# Patient Record
Sex: MALE | Race: WHITE | Employment: OTHER | URBAN - METROPOLITAN AREA
[De-identification: names, ages, dates, MRNs, and addresses within clinical notes are randomized per-mention and may not be internally consistent; named-entity substitution may affect disease eponyms.]

---

## 2017-11-01 LAB
ANION GAP SERPL CALCULATED.3IONS-SCNC: 13 MEQ/L (ref 7–13)
BUN BLDV-MCNC: 15 MG/DL (ref 8–23)
CALCIUM SERPL-MCNC: 9.1 MG/DL (ref 8.6–10.2)
CHLORIDE BLD-SCNC: 100 MEQ/L (ref 98–107)
CO2: 26 MEQ/L (ref 22–29)
CREAT SERPL-MCNC: 1.3 MG/DL (ref 0.7–1.2)
GFR AFRICAN AMERICAN: >60
GFR NON-AFRICAN AMERICAN: 53.6
GLUCOSE BLD-MCNC: 92 MG/DL (ref 74–109)
POTASSIUM SERPL-SCNC: 4.5 MEQ/L (ref 3.5–5.1)
SODIUM BLD-SCNC: 139 MEQ/L (ref 132–144)

## 2019-12-09 LAB
BILIRUBIN DIRECT: 0.1 MG/DL (ref 0–0.3)
CHOLESTEROL/HDL RATIO: 3.8
CHOLESTEROL: 94 MG/DL (ref 0–199)
HDLC SERPL-MCNC: 25 MG/DL
LDL CHOLESTEROL: 40 MG/DL (ref 0–99)
TRIGL SERPL-MCNC: 147 MG/DL (ref 0–149)
VLDLC SERPL CALC-MCNC: 29 MG/DL (ref 0–40)

## 2020-10-20 ENCOUNTER — NURSE ONLY (OUTPATIENT)
Dept: PRIMARY CARE CLINIC | Age: 80
End: 2020-10-20

## 2020-10-21 LAB
SARS-COV-2: NOT DETECTED
SOURCE: NORMAL

## 2020-10-28 ENCOUNTER — NURSE ONLY (OUTPATIENT)
Dept: PRIMARY CARE CLINIC | Age: 80
End: 2020-10-28

## 2020-10-29 LAB
SARS-COV-2: NOT DETECTED
SOURCE: NORMAL

## 2023-05-25 LAB
ANION GAP IN SER/PLAS: 14 MMOL/L (ref 10–20)
BASOPHILS (10*3/UL) IN BLOOD BY AUTOMATED COUNT: 0.06 X10E9/L (ref 0–0.1)
BASOPHILS/100 LEUKOCYTES IN BLOOD BY AUTOMATED COUNT: 0.8 % (ref 0–2)
CALCIUM (MG/DL) IN SER/PLAS: 9.5 MG/DL (ref 8.6–10.3)
CARBON DIOXIDE, TOTAL (MMOL/L) IN SER/PLAS: 27 MMOL/L (ref 21–32)
CHLORIDE (MMOL/L) IN SER/PLAS: 101 MMOL/L (ref 98–107)
CREATININE (MG/DL) IN SER/PLAS: 1.4 MG/DL (ref 0.5–1.3)
DACROCYTES PRESENCE IN BLOOD BY LIGHT MICROSCOPY: NORMAL
EOSINOPHILS (10*3/UL) IN BLOOD BY AUTOMATED COUNT: 0.06 X10E9/L (ref 0–0.4)
EOSINOPHILS/100 LEUKOCYTES IN BLOOD BY AUTOMATED COUNT: 0.8 % (ref 0–6)
ERYTHROCYTE DISTRIBUTION WIDTH (RATIO) BY AUTOMATED COUNT: 21.2 % (ref 11.5–14.5)
ERYTHROCYTE MEAN CORPUSCULAR HEMOGLOBIN CONCENTRATION (G/DL) BY AUTOMATED: 31.6 G/DL (ref 32–36)
ERYTHROCYTE MEAN CORPUSCULAR VOLUME (FL) BY AUTOMATED COUNT: 97 FL (ref 80–100)
ERYTHROCYTES (10*6/UL) IN BLOOD BY AUTOMATED COUNT: 3.54 X10E12/L (ref 4.5–5.9)
GFR MALE: 50 ML/MIN/1.73M2
GLUCOSE (MG/DL) IN SER/PLAS: 99 MG/DL (ref 74–99)
HEMATOCRIT (%) IN BLOOD BY AUTOMATED COUNT: 34.5 % (ref 41–52)
HEMOGLOBIN (G/DL) IN BLOOD: 10.9 G/DL (ref 13.5–17.5)
IMMATURE GRANULOCYTES/100 LEUKOCYTES IN BLOOD BY AUTOMATED COUNT: 4.4 % (ref 0–0.9)
LEUKOCYTES (10*3/UL) IN BLOOD BY AUTOMATED COUNT: 7.4 X10E9/L (ref 4.4–11.3)
LYMPHOCYTES (10*3/UL) IN BLOOD BY AUTOMATED COUNT: 0.99 X10E9/L (ref 0.8–3)
LYMPHOCYTES/100 LEUKOCYTES IN BLOOD BY AUTOMATED COUNT: 13.3 % (ref 13–44)
MONOCYTES (10*3/UL) IN BLOOD BY AUTOMATED COUNT: 0.46 X10E9/L (ref 0.05–0.8)
MONOCYTES/100 LEUKOCYTES IN BLOOD BY AUTOMATED COUNT: 6.2 % (ref 2–10)
NEUTROPHILS (10*3/UL) IN BLOOD BY AUTOMATED COUNT: 5.53 X10E9/L (ref 1.6–5.5)
NEUTROPHILS/100 LEUKOCYTES IN BLOOD BY AUTOMATED COUNT: 74.5 % (ref 40–80)
NRBC (PER 100 WBCS) BY AUTOMATED COUNT: 0.3 /100 WBC (ref 0–0)
OVALOCYTES PRESENCE IN BLOOD BY LIGHT MICROSCOPY: NORMAL
PARATHYRIN INTACT (PG/ML) IN SER/PLAS: 57.6 PG/ML (ref 18.5–88)
PHOSPHATE (MG/DL) IN SER/PLAS: 3.7 MG/DL (ref 2.5–4.9)
PLATELETS (10*3/UL) IN BLOOD AUTOMATED COUNT: 248 X10E9/L (ref 150–450)
POTASSIUM (MMOL/L) IN SER/PLAS: 4.6 MMOL/L (ref 3.5–5.3)
PROSTATE SPECIFIC ANTIGEN,SCREEN: 0.62 NG/ML (ref 0–4)
RBC MORPHOLOGY IN BLOOD: NORMAL
SCHISTOCYTES (PRESENCE) IN BLOOD BY LIGHT MICROSCOPY: NORMAL
SODIUM (MMOL/L) IN SER/PLAS: 137 MMOL/L (ref 136–145)
URATE (MG/DL) IN SER/PLAS: 5.5 MG/DL (ref 4–7.5)
UREA NITROGEN (MG/DL) IN SER/PLAS: 18 MG/DL (ref 6–23)

## 2023-07-20 LAB
ALBUMIN: 4.4 G/DL (ref 3.4–5)
ALP BLD-CCNC: 58 U/L (ref 33–136)
ALT SERPL-CCNC: 14 U/L (ref 10–52)
ANION GAP SERPL CALCULATED.3IONS-SCNC: 11 MMOL/L (ref 10–20)
AST SERPL-CCNC: 19 U/L (ref 9–39)
BICARBONATE: 29 MMOL/L (ref 21–32)
BILIRUB SERPL-MCNC: 0.6 MG/DL (ref 0–1.2)
CALCIUM SERPL-MCNC: 9.4 MG/DL (ref 8.6–10.3)
CHLORIDE BLD-SCNC: 104 MMOL/L (ref 98–107)
CREAT SERPL-MCNC: 1.57 MG/DL (ref 0.5–1.3)
EGFR FEMALE: ABNORMAL
EGFR MALE: 44 ML/MIN/1.73M2
GLUCOSE: 109 MG/DL (ref 74–99)
POTASSIUM SERPL-SCNC: 4.5 MMOL/L (ref 3.5–5.3)
SODIUM BLD-SCNC: 139 MMOL/L (ref 136–145)
TOTAL PROTEIN: 7 G/DL (ref 6.4–8.2)
UREA NITROGEN: 20 MG/DL (ref 6–23)

## 2023-08-18 RX ORDER — NITROGLYCERIN 0.4 MG/1
0.4 TABLET SUBLINGUAL EVERY 5 MIN PRN
COMMUNITY

## 2023-08-18 RX ORDER — ALLOPURINOL 100 MG/1
1 TABLET ORAL 2 TIMES DAILY
COMMUNITY

## 2023-08-18 RX ORDER — AMLODIPINE BESYLATE 5 MG/1
1 TABLET ORAL DAILY
COMMUNITY
End: 2024-01-03 | Stop reason: SDUPTHER

## 2023-08-18 RX ORDER — TAMSULOSIN HYDROCHLORIDE 0.4 MG/1
1 CAPSULE ORAL DAILY
COMMUNITY
Start: 2022-09-02

## 2023-08-18 RX ORDER — PANTOPRAZOLE SODIUM 40 MG/1
1 TABLET, DELAYED RELEASE ORAL DAILY
COMMUNITY

## 2023-08-18 RX ORDER — ALPRAZOLAM 0.5 MG/1
0.5 TABLET ORAL 3 TIMES DAILY PRN
COMMUNITY

## 2023-08-18 RX ORDER — METOPROLOL SUCCINATE 25 MG/1
1 TABLET, EXTENDED RELEASE ORAL DAILY
COMMUNITY
End: 2023-11-06 | Stop reason: ALTCHOICE

## 2023-08-18 RX ORDER — VIT C/E/ZN/COPPR/LUTEIN/ZEAXAN 250MG-90MG
CAPSULE ORAL DAILY
COMMUNITY

## 2023-08-18 RX ORDER — OLMESARTAN MEDOXOMIL 40 MG/1
1 TABLET ORAL DAILY
COMMUNITY
End: 2024-01-03 | Stop reason: SDUPTHER

## 2023-08-18 RX ORDER — ACETAMINOPHEN 500 MG
1 TABLET ORAL DAILY
COMMUNITY

## 2023-08-18 RX ORDER — ATORVASTATIN CALCIUM 20 MG/1
1 TABLET, FILM COATED ORAL DAILY
COMMUNITY
End: 2024-01-03 | Stop reason: SDUPTHER

## 2023-08-18 RX ORDER — AMLODIPINE BESYLATE 2.5 MG/1
2 TABLET ORAL DAILY
COMMUNITY
Start: 2022-11-09 | End: 2023-11-06 | Stop reason: ALTCHOICE

## 2023-08-18 RX ORDER — ASPIRIN 81 MG/1
1 TABLET ORAL DAILY
COMMUNITY

## 2023-08-18 RX ORDER — DOXAZOSIN 1 MG/1
1 TABLET ORAL DAILY
COMMUNITY
Start: 2021-10-04 | End: 2023-11-01 | Stop reason: SDUPTHER

## 2023-09-18 PROBLEM — Z98.62 STATUS POST ANGIOPLASTY: Status: ACTIVE | Noted: 2023-09-18

## 2023-09-18 PROBLEM — D64.9 ANEMIA: Status: ACTIVE | Noted: 2023-09-18

## 2023-09-18 PROBLEM — D75.1 POLYCYTHEMIA: Status: ACTIVE | Noted: 2023-09-18

## 2023-09-18 PROBLEM — I73.00 RAYNAUD'S PHENOMENON WITHOUT GANGRENE: Status: ACTIVE | Noted: 2023-09-18

## 2023-09-18 PROBLEM — R79.89 ELEVATED SERUM CREATININE: Status: ACTIVE | Noted: 2023-09-18

## 2023-09-18 PROBLEM — I10 HYPERTENSION: Status: ACTIVE | Noted: 2023-09-18

## 2023-09-18 PROBLEM — C64.2: Status: ACTIVE | Noted: 2023-09-18

## 2023-09-18 PROBLEM — E78.5 HYPERLIPIDEMIA: Status: ACTIVE | Noted: 2023-09-18

## 2023-09-18 PROBLEM — N18.30 CKD (CHRONIC KIDNEY DISEASE), STAGE III (MULTI): Status: ACTIVE | Noted: 2023-09-18

## 2023-09-18 PROBLEM — R01.1 SYSTOLIC EJECTION MURMUR: Status: ACTIVE | Noted: 2023-09-18

## 2023-09-18 PROBLEM — R06.02 SHORTNESS OF BREATH: Status: ACTIVE | Noted: 2023-09-18

## 2023-09-18 PROBLEM — R33.9 RETENTION OF URINE: Status: ACTIVE | Noted: 2023-09-18

## 2023-09-18 PROBLEM — N20.0 NEPHROLITHIASIS: Status: ACTIVE | Noted: 2023-09-18

## 2023-09-18 PROBLEM — I25.10 ARTERIOSCLEROSIS OF CORONARY ARTERY: Status: ACTIVE | Noted: 2023-09-18

## 2023-09-18 PROBLEM — I49.3 PREMATURE VENTRICULAR CONTRACTION: Status: ACTIVE | Noted: 2023-09-18

## 2023-09-18 PROBLEM — E66.3 OVERWEIGHT WITH BODY MASS INDEX (BMI) OF 28 TO 28.9 IN ADULT: Status: ACTIVE | Noted: 2023-09-18

## 2023-09-18 PROBLEM — N13.8 BPH WITH URINARY OBSTRUCTION: Status: ACTIVE | Noted: 2023-09-18

## 2023-09-18 PROBLEM — Z90.5 SINGLE KIDNEY: Status: ACTIVE | Noted: 2023-09-18

## 2023-09-18 PROBLEM — N40.1 BPH WITH URINARY OBSTRUCTION: Status: ACTIVE | Noted: 2023-09-18

## 2023-09-18 PROBLEM — Z87.891 FORMER SMOKER: Status: ACTIVE | Noted: 2023-09-18

## 2023-09-18 PROBLEM — N28.89 RENAL MASS, LEFT: Status: ACTIVE | Noted: 2023-09-18

## 2023-09-25 LAB
ANION GAP IN SER/PLAS: 11 MMOL/L (ref 10–20)
BAND NEUTROPHILS (10*3/UL) BLOOD MANUAL COUNT - WAM: 0.08 X10E9/L (ref 0–0.5)
BASOPHILS (10*3/UL) IN BLOOD BY MANUAL COUNT - WAM: 0 X10E9/L (ref 0–0.1)
BASOPHILS/100 LEUKOCYTES IN BLOOD BY MANUAL COUNT - WAM: 0 % (ref 0–2)
CALCIUM (MG/DL) IN SER/PLAS: 9.3 MG/DL (ref 8.6–10.3)
CARBON DIOXIDE, TOTAL (MMOL/L) IN SER/PLAS: 29 MMOL/L (ref 21–32)
CHLORIDE (MMOL/L) IN SER/PLAS: 102 MMOL/L (ref 98–107)
CREATININE (MG/DL) IN SER/PLAS: 1.64 MG/DL (ref 0.5–1.3)
DACROCYTES PRESENCE IN BLOOD BY LIGHT MICROSCOPY: NORMAL
EOSINOPHILS (10*3/UL) IN BLOOD BY MANUAL COUNT - WAM: 0.08 X10E9/L (ref 0–0.4)
EOSINOPHILS/100 LEUKOCYTES IN BLOOD BY MANUAL COUNT - WAM: 1 % (ref 0–6)
ERYTHROCYTE DISTRIBUTION WIDTH (RATIO) BY AUTOMATED COUNT: 21.6 % (ref 11.5–14.5)
ERYTHROCYTE MEAN CORPUSCULAR HEMOGLOBIN CONCENTRATION (G/DL) BY AUTOMATED: 31.5 G/DL (ref 32–36)
ERYTHROCYTE MEAN CORPUSCULAR VOLUME (FL) BY AUTOMATED COUNT: 100 FL (ref 80–100)
ERYTHROCYTES (10*6/UL) IN BLOOD BY AUTOMATED COUNT: 3.56 X10E12/L (ref 4.5–5.9)
GFR MALE: 41 ML/MIN/1.73M2
GLUCOSE (MG/DL) IN SER/PLAS: 103 MG/DL (ref 74–99)
HEMATOCRIT (%) IN BLOOD BY AUTOMATED COUNT: 35.5 % (ref 41–52)
HEMOGLOBIN (G/DL) IN BLOOD: 11.2 G/DL (ref 13.5–17.5)
IMMATURE GRANULOCYTES/100 LEUKOCYTES IN BLOOD BY AUTOMATED COUNT: 6.9 % (ref 0–0.9)
LEUKOCYTES (10*3/UL) IN BLOOD BY AUTOMATED COUNT: 7.5 X10E9/L (ref 4.4–11.3)
LYMPHOCYTES (10*3/UL) IN BLOOD BY MANUAL COUNT - WAM: 1.2 X10E9/L (ref 0.8–3)
LYMPHOCYTES/100 LEUKOCYTES IN BLOOD BY MANUAL COUNT - WAM: 16 % (ref 13–44)
MANUAL DIFFERENTIAL Y/N: ABNORMAL
METAMYELOCYTES (10*3/UL) IN BLOOD BY MANUAL COUNT - WAM: 0.15 X10E9/L (ref 0–0)
METAMYELOCYTES/100 LEUKOCYTES IN BLOOD BY MANUAL COUNT - WAM: 2 % (ref 0–0)
MONOCYTES (10*3/UL) IN BLOOD BY MANUAL COUNT - WAM: 0.3 X10E9/L (ref 0.05–0.8)
MONOCYTES/100 LEUKOCYTES IN BLOOD BY MANUAL COUNT - WAM: 4 % (ref 2–10)
MYELOCYTES (10*3/UL) IN BLOOD BY MANUAL COUNT - WAM: 0.23 X10E9/L (ref 0–0)
MYELOCYTES/100 LEUKOCYTES IN BLOOD BY MANUAL COUNT - WAM: 3 % (ref 0–0)
NEUTROPHILS (SEGS+BANDS) (10*3/UL) MANUAL COUNT - WAM: 5.56 X10E9/L (ref 1.6–5.5)
NEUTROPHILS BAND FORM/100 LEUKOCYTES IN BLOOD BY MANUAL COUNT - WAM: 1 % (ref 0–5)
NRBC (PER 100 WBCS) BY AUTOMATED COUNT: 0.3 /100 WBC (ref 0–0)
OVALOCYTES PRESENCE IN BLOOD BY LIGHT MICROSCOPY: NORMAL
PARATHYRIN INTACT (PG/ML) IN SER/PLAS: 44.6 PG/ML (ref 18.5–88)
PHOSPHATE (MG/DL) IN SER/PLAS: 3.8 MG/DL (ref 2.5–4.9)
PLATELETS (10*3/UL) IN BLOOD AUTOMATED COUNT: 249 X10E9/L (ref 150–450)
POTASSIUM (MMOL/L) IN SER/PLAS: 4.6 MMOL/L (ref 3.5–5.3)
PROSTATE SPECIFIC AG (NG/ML) IN SER/PLAS: 0.74 NG/ML (ref 0–4)
RBC MORPHOLOGY IN BLOOD: NORMAL
SCHISTOCYTES (PRESENCE) IN BLOOD BY LIGHT MICROSCOPY: NORMAL
SEGMENTED NEUTROPHILS (10*3/UL) BLOOD MANUAL - WAM: 5.48 X10E9/L (ref 1.6–5)
SEGMENTED NEUTROPHILS/100 LEUKOCYTES BY MANUAL COUNT -: 73 % (ref 40–80)
SODIUM (MMOL/L) IN SER/PLAS: 137 MMOL/L (ref 136–145)
URATE (MG/DL) IN SER/PLAS: 7 MG/DL (ref 4–7.5)
UREA NITROGEN (MG/DL) IN SER/PLAS: 28 MG/DL (ref 6–23)

## 2023-10-18 ENCOUNTER — APPOINTMENT (OUTPATIENT)
Dept: HEMATOLOGY/ONCOLOGY | Facility: CLINIC | Age: 83
End: 2023-10-18

## 2023-11-01 DIAGNOSIS — I10 PRIMARY HYPERTENSION: ICD-10-CM

## 2023-11-01 RX ORDER — DOXAZOSIN 1 MG/1
1 TABLET ORAL DAILY
Qty: 90 TABLET | Refills: 3 | Status: SHIPPED | OUTPATIENT
Start: 2023-11-01 | End: 2024-01-03 | Stop reason: SDUPTHER

## 2023-11-03 ENCOUNTER — LAB (OUTPATIENT)
Dept: LAB | Facility: CLINIC | Age: 83
End: 2023-11-03
Payer: MEDICARE

## 2023-11-03 DIAGNOSIS — I12.9 HYPERTENSIVE CHRONIC KIDNEY DISEASE WITH STAGE 1 THROUGH STAGE 4 CHRONIC KIDNEY DISEASE, OR UNSPECIFIED CHRONIC KIDNEY DISEASE: ICD-10-CM

## 2023-11-03 DIAGNOSIS — D63.1 ANEMIA IN CHRONIC KIDNEY DISEASE (CODE): ICD-10-CM

## 2023-11-03 DIAGNOSIS — D75.1 POLYCYTHEMIA: ICD-10-CM

## 2023-11-03 DIAGNOSIS — N18.32 CHRONIC KIDNEY DISEASE, STAGE 3B (MULTI): ICD-10-CM

## 2023-11-03 DIAGNOSIS — D75.1 POLYCYTHEMIA: Primary | ICD-10-CM

## 2023-11-03 DIAGNOSIS — N18.32 CHRONIC KIDNEY DISEASE, STAGE 3B (MULTI): Primary | ICD-10-CM

## 2023-11-03 LAB
ALBUMIN SERPL BCP-MCNC: 4.4 G/DL (ref 3.4–5)
ALP SERPL-CCNC: 60 U/L (ref 33–136)
ALT SERPL W P-5'-P-CCNC: 15 U/L (ref 10–52)
ANION GAP SERPL CALC-SCNC: 11 MMOL/L (ref 10–20)
AST SERPL W P-5'-P-CCNC: 20 U/L (ref 9–39)
BASOPHILS # BLD AUTO: 0.03 X10*3/UL (ref 0–0.1)
BASOPHILS NFR BLD AUTO: 0.5 %
BILIRUB SERPL-MCNC: 0.7 MG/DL (ref 0–1.2)
BUN SERPL-MCNC: 20 MG/DL (ref 6–23)
CALCIUM SERPL-MCNC: 9.5 MG/DL (ref 8.6–10.3)
CHLORIDE SERPL-SCNC: 103 MMOL/L (ref 98–107)
CO2 SERPL-SCNC: 30 MMOL/L (ref 21–32)
CREAT SERPL-MCNC: 1.59 MG/DL (ref 0.5–1.3)
EOSINOPHIL # BLD AUTO: 0.07 X10*3/UL (ref 0–0.4)
EOSINOPHIL NFR BLD AUTO: 1.1 %
ERYTHROCYTE [DISTWIDTH] IN BLOOD BY AUTOMATED COUNT: 19.8 % (ref 11.5–14.5)
FERRITIN SERPL-MCNC: 69 NG/ML (ref 20–300)
GFR SERPL CREATININE-BSD FRML MDRD: 43 ML/MIN/1.73M*2
GLUCOSE SERPL-MCNC: 127 MG/DL (ref 74–99)
HCT VFR BLD AUTO: 33.1 % (ref 41–52)
HGB BLD-MCNC: 10.8 G/DL (ref 13.5–17.5)
IMM GRANULOCYTES # BLD AUTO: 0.31 X10*3/UL (ref 0–0.5)
IMM GRANULOCYTES NFR BLD AUTO: 5 % (ref 0–0.9)
IRON SATN MFR SERPL: 27 % (ref 25–45)
IRON SERPL-MCNC: 82 UG/DL (ref 35–150)
LYMPHOCYTES # BLD AUTO: 1.16 X10*3/UL (ref 0.8–3)
LYMPHOCYTES NFR BLD AUTO: 18.7 %
MCH RBC QN AUTO: 32.3 PG (ref 26–34)
MCHC RBC AUTO-ENTMCNC: 32.6 G/DL (ref 32–36)
MCV RBC AUTO: 99 FL (ref 80–100)
MONOCYTES # BLD AUTO: 0.28 X10*3/UL (ref 0.05–0.8)
MONOCYTES NFR BLD AUTO: 4.5 %
NEUTROPHILS # BLD AUTO: 4.36 X10*3/UL (ref 1.6–5.5)
NEUTROPHILS NFR BLD AUTO: 70.2 %
PLATELET # BLD AUTO: 212 X10*3/UL (ref 150–450)
POTASSIUM SERPL-SCNC: 4.1 MMOL/L (ref 3.5–5.3)
PROT SERPL-MCNC: 6.9 G/DL (ref 6.4–8.2)
RBC # BLD AUTO: 3.34 X10*6/UL (ref 4.5–5.9)
SODIUM SERPL-SCNC: 140 MMOL/L (ref 136–145)
TIBC SERPL-MCNC: 307 UG/DL (ref 240–445)
UIBC SERPL-MCNC: 225 UG/DL (ref 110–370)
WBC # BLD AUTO: 6.2 X10*3/UL (ref 4.4–11.3)

## 2023-11-03 PROCEDURE — 80053 COMPREHEN METABOLIC PANEL: CPT

## 2023-11-03 PROCEDURE — 82728 ASSAY OF FERRITIN: CPT

## 2023-11-03 PROCEDURE — 36415 COLL VENOUS BLD VENIPUNCTURE: CPT

## 2023-11-03 PROCEDURE — 83540 ASSAY OF IRON: CPT

## 2023-11-03 PROCEDURE — 85025 COMPLETE CBC W/AUTO DIFF WBC: CPT

## 2023-11-06 ENCOUNTER — OFFICE VISIT (OUTPATIENT)
Dept: HEMATOLOGY/ONCOLOGY | Facility: CLINIC | Age: 83
End: 2023-11-06
Payer: MEDICARE

## 2023-11-06 VITALS
HEART RATE: 100 BPM | DIASTOLIC BLOOD PRESSURE: 73 MMHG | RESPIRATION RATE: 15 BRPM | TEMPERATURE: 97.2 F | BODY MASS INDEX: 29.39 KG/M2 | SYSTOLIC BLOOD PRESSURE: 156 MMHG | WEIGHT: 204.81 LBS | OXYGEN SATURATION: 99 %

## 2023-11-06 DIAGNOSIS — N40.1 BPH WITH URINARY OBSTRUCTION: ICD-10-CM

## 2023-11-06 DIAGNOSIS — C64.2 CARCINOMA OF LEFT KIDNEY (MULTI): ICD-10-CM

## 2023-11-06 DIAGNOSIS — D50.8 OTHER IRON DEFICIENCY ANEMIA: ICD-10-CM

## 2023-11-06 DIAGNOSIS — E83.110 HEREDITARY HEMOCHROMATOSIS (CMS-HCC): Primary | ICD-10-CM

## 2023-11-06 DIAGNOSIS — E78.2 MIXED HYPERLIPIDEMIA: ICD-10-CM

## 2023-11-06 DIAGNOSIS — I10 PRIMARY HYPERTENSION: ICD-10-CM

## 2023-11-06 DIAGNOSIS — N13.8 BPH WITH URINARY OBSTRUCTION: ICD-10-CM

## 2023-11-06 PROCEDURE — 1036F TOBACCO NON-USER: CPT | Performed by: INTERNAL MEDICINE

## 2023-11-06 PROCEDURE — 99214 OFFICE O/P EST MOD 30 MIN: CPT | Performed by: INTERNAL MEDICINE

## 2023-11-06 PROCEDURE — 1126F AMNT PAIN NOTED NONE PRSNT: CPT | Performed by: INTERNAL MEDICINE

## 2023-11-06 PROCEDURE — 1159F MED LIST DOCD IN RCRD: CPT | Performed by: INTERNAL MEDICINE

## 2023-11-06 PROCEDURE — 3077F SYST BP >= 140 MM HG: CPT | Performed by: INTERNAL MEDICINE

## 2023-11-06 PROCEDURE — 3078F DIAST BP <80 MM HG: CPT | Performed by: INTERNAL MEDICINE

## 2023-11-06 ASSESSMENT — PATIENT HEALTH QUESTIONNAIRE - PHQ9
7. TROUBLE CONCENTRATING ON THINGS, SUCH AS READING THE NEWSPAPER OR WATCHING TELEVISION: NOT AT ALL
6. FEELING BAD ABOUT YOURSELF - OR THAT YOU ARE A FAILURE OR HAVE LET YOURSELF OR YOUR FAMILY DOWN: 0
5. POOR APPETITE OR OVEREATING: NOT AT ALL
9. THOUGHTS THAT YOU WOULD BE BETTER OFF DEAD, OR OF HURTING YOURSELF: NOT AT ALL
1. LITTLE INTEREST OR PLEASURE IN DOING THINGS: NOT AT ALL
9. THOUGHTS THAT YOU WOULD BE BETTER OFF DEAD, OR OF HURTING YOURSELF: NOT AT ALL
1. LITTLE INTEREST OR PLEASURE IN DOING THINGS: NOT AT ALL
5. POOR APPETITE OR OVEREATING: 0
2. FEELING DOWN, DEPRESSED, IRRITABLE, OR HOPELESS: NOT AT ALL
2. FEELING DOWN, DEPRESSED OR HOPELESS: NOT AT ALL
3. TROUBLE FALLING OR STAYING ASLEEP OR SLEEPING TOO MUCH: SEVERAL DAYS
1. LITTLE INTEREST OR PLEASURE IN DOING THINGS: NOT AT ALL
3. TROUBLE FALLING OR STAYING ASLEEP OR SLEEPING TOO MUCH: 1
2. FEELING DOWN, DEPRESSED, IRRITABLE, OR HOPELESS: 0
9. THOUGHTS THAT YOU WOULD BE BETTER OFF DEAD, OR OF HURTING YOURSELF: 0
6. FEELING BAD ABOUT YOURSELF - OR THAT YOU ARE A FAILURE OR HAVE LET YOURSELF OR YOUR FAMILY DOWN: NOT AT ALL
4. FEELING TIRED OR HAVING LITTLE ENERGY: 1
7. TROUBLE CONCENTRATING ON THINGS, SUCH AS READING THE NEWSPAPER OR WATCHING TELEVISION: NOT AT ALL
4. FEELING TIRED OR HAVING LITTLE ENERGY: SEVERAL DAYS
SUM OF ALL RESPONSES TO PHQ9 QUESTIONS 1 AND 2: 0
2. FEELING DOWN, DEPRESSED OR HOPELESS: NOT AT ALL
5. POOR APPETITE OR OVEREATING: NOT AT ALL
8. MOVING OR SPEAKING SO SLOWLY THAT OTHER PEOPLE COULD HAVE NOTICED. OR THE OPPOSITE, BEING SO FIGETY OR RESTLESS THAT YOU HAVE BEEN MOVING AROUND A LOT MORE THAN USUAL: NOT AT ALL
SUM OF ALL RESPONSES TO PHQ QUESTIONS 1-9: 2
4. FEELING TIRED OR HAVING LITTLE ENERGY: SEVERAL DAYS
6. FEELING BAD ABOUT YOURSELF - OR THAT YOU ARE A FAILURE OR HAVE LET YOURSELF OR YOUR FAMILY DOWN: NOT AT ALL
1. LITTLE INTEREST OR PLEASURE IN DOING THINGS: 0
8. MOVING OR SPEAKING SO SLOWLY THAT OTHER PEOPLE COULD HAVE NOTICED. OR THE OPPOSITE, BEING SO FIGETY OR RESTLESS THAT YOU HAVE BEEN MOVING AROUND A LOT MORE THAN USUAL: 0
7. TROUBLE CONCENTRATING ON THINGS, SUCH AS READING THE NEWSPAPER OR WATCHING TELEVISION: 0
8. MOVING OR SPEAKING SO SLOWLY THAT OTHER PEOPLE COULD HAVE NOTICED. OR THE OPPOSITE, BEING SO FIGETY OR RESTLESS THAT YOU HAVE BEEN MOVING AROUND A LOT MORE THAN USUAL: NOT AT ALL
3. TROUBLE FALLING OR STAYING ASLEEP OR SLEEPING TOO MUCH: SEVERAL DAYS
SUM OF ALL RESPONSES TO PHQ QUESTIONS 1-9: 2

## 2023-11-06 ASSESSMENT — ENCOUNTER SYMPTOMS
DEPRESSION: 0
PSYCHIATRIC NEGATIVE: 1
EYES NEGATIVE: 1
LOSS OF SENSATION IN FEET: 0
NEUROLOGICAL NEGATIVE: 1
HEMATOLOGIC/LYMPHATIC NEGATIVE: 1
MUSCULOSKELETAL NEGATIVE: 1
CARDIOVASCULAR NEGATIVE: 1
OCCASIONAL FEELINGS OF UNSTEADINESS: 0
RESPIRATORY NEGATIVE: 1
ENDOCRINE NEGATIVE: 1
CONSTITUTIONAL NEGATIVE: 1
GASTROINTESTINAL NEGATIVE: 1

## 2023-11-06 ASSESSMENT — COLUMBIA-SUICIDE SEVERITY RATING SCALE - C-SSRS
2. HAVE YOU ACTUALLY HAD ANY THOUGHTS OF KILLING YOURSELF?: NO
6. HAVE YOU EVER DONE ANYTHING, STARTED TO DO ANYTHING, OR PREPARED TO DO ANYTHING TO END YOUR LIFE?: NO
1. IN THE PAST MONTH, HAVE YOU WISHED YOU WERE DEAD OR WISHED YOU COULD GO TO SLEEP AND NOT WAKE UP?: NO

## 2023-11-06 ASSESSMENT — PAIN SCALES - GENERAL: PAINLEVEL: 0-NO PAIN

## 2023-11-06 NOTE — PROGRESS NOTES
Patient ID: Arnoldo Gracia is a 83 y.o. male.  Referring Physician: No referring provider defined for this encounter.  Primary Care Provider: Zuleyka Kumar MD  Visit Type: Follow Up      Subjective    HPI  How is my hemoglobin?    Review of Systems   Constitutional: Negative.    HENT:  Negative.     Eyes: Negative.    Respiratory: Negative.     Cardiovascular: Negative.    Gastrointestinal: Negative.    Endocrine: Negative.    Genitourinary: Negative.     Musculoskeletal: Negative.    Skin: Negative.    Neurological: Negative.    Hematological: Negative.    Psychiatric/Behavioral: Negative.          Objective   BSA: 2.14 meters squared  /73 Comment: antony guerra notified  Pulse 100   Temp 36.2 °C (97.2 °F)   Resp 15   Wt 92.9 kg (204 lb 12.9 oz)   SpO2 99%   BMI 29.39 kg/m²      has a past medical history of Essential (primary) hypertension, Old myocardial infarction, Personal history of diseases of the blood and blood-forming organs and certain disorders involving the immune mechanism, Personal history of other diseases of the circulatory system, Personal history of other diseases of the circulatory system, Personal history of other endocrine, nutritional and metabolic disease, Personal history of other specified conditions, and Personal history of other specified conditions.   has a past surgical history that includes Other surgical history (04/11/2018); Other surgical history (11/07/2021); Other surgical history (11/07/2021); Other surgical history (11/07/2021); Other surgical history (11/07/2021); Other surgical history (11/07/2021); Other surgical history (11/14/2018); Other surgical history (11/14/2018); Other surgical history (11/14/2018); and Other surgical history (11/14/2018).  Family History   Problem Relation Name Age of Onset    Hypertension Mother      Other (cardiac disorder) Mother      Other (cardiac disorder) Father      Emphysema Father      Heart attack Father      Colon cancer  Sister      Stomach cancer Sister       Oncology History    No history exists.       Arnoldo Gracia  reports that he has never smoked. He has never used smokeless tobacco.  He  reports no history of alcohol use.  He  reports no history of drug use.    Physical Exam  Vitals reviewed.   HENT:      Head: Normocephalic.      Mouth/Throat:      Mouth: Mucous membranes are moist.   Eyes:      Extraocular Movements: Extraocular movements intact.      Pupils: Pupils are equal, round, and reactive to light.   Cardiovascular:      Rate and Rhythm: Normal rate and regular rhythm.      Heart sounds: Normal heart sounds.   Pulmonary:      Breath sounds: Normal breath sounds.   Abdominal:      General: Bowel sounds are normal.      Palpations: Abdomen is soft.   Musculoskeletal:         General: Normal range of motion.      Cervical back: Normal range of motion and neck supple.   Skin:     General: Skin is warm and dry.   Neurological:      General: No focal deficit present.      Mental Status: He is alert.   Psychiatric:         Mood and Affect: Mood normal.         WBC   Date/Time Value Ref Range Status   11/03/2023 08:41 AM 6.2 4.4 - 11.3 x10*3/uL Final   09/25/2023 09:10 AM 7.5 4.4 - 11.3 x10E9/L Final   07/20/2023 09:20 AM CANCELED       Comment:     Result canceled by the ancillary.   07/20/2023 09:08 AM 6.3 4.4 - 11.3 x10E9/L Final     nRBC   Date Value Ref Range Status   09/25/2023 0.3 0.0 - 0.0 /100 WBC Final   07/20/2023 CANCELED       Comment:     Result canceled by the ancillary.   05/25/2023 0.3 0.0 - 0.0 /100 WBC Final     RBC   Date Value Ref Range Status   11/03/2023 3.34 (L) 4.50 - 5.90 x10*6/uL Final   09/25/2023 3.56 (L) 4.50 - 5.90 x10E12/L Final   07/20/2023 CANCELED       Comment:     Result canceled by the ancillary.   07/20/2023 3.46 (L) 4.50 - 5.90 x10E12/L Final     Hemoglobin   Date Value Ref Range Status   11/03/2023 10.8 (L) 13.5 - 17.5 g/dL Final   09/25/2023 11.2 (L) 13.5 - 17.5 g/dL Final  "  07/20/2023 CANCELED       Comment:     Result canceled by the ancillary.   07/20/2023 11.0 (L) 13.5 - 17.5 g/dL Final     Hematocrit   Date Value Ref Range Status   11/03/2023 33.1 (L) 41.0 - 52.0 % Final   09/25/2023 35.5 (L) 41.0 - 52.0 % Final   07/20/2023 CANCELED       Comment:     Result canceled by the ancillary.   07/20/2023 33.2 (L) 41.0 - 52.0 % Final     MCV   Date/Time Value Ref Range Status   11/03/2023 08:41 AM 99 80 - 100 fL Final   09/25/2023 09:10  80 - 100 fL Final   07/20/2023 09:20 AM CANCELED       Comment:     Result canceled by the ancillary.   07/20/2023 09:08 AM 96 80 - 100 fL Final     MCH   Date/Time Value Ref Range Status   11/03/2023 08:41 AM 32.3 26.0 - 34.0 pg Final     MCHC   Date/Time Value Ref Range Status   11/03/2023 08:41 AM 32.6 32.0 - 36.0 g/dL Final   09/25/2023 09:10 AM 31.5 (L) 32.0 - 36.0 g/dL Final   07/20/2023 09:20 AM CANCELED       Comment:     Result canceled by the ancillary.   07/20/2023 09:08 AM 33.1 32.0 - 36.0 g/dL Final     RDW   Date/Time Value Ref Range Status   11/03/2023 08:41 AM 19.8 (H) 11.5 - 14.5 % Final   09/25/2023 09:10 AM 21.6 (H) 11.5 - 14.5 % Final   07/20/2023 09:20 AM CANCELED       Comment:     Result canceled by the ancillary.   07/20/2023 09:08 AM 20.7 (H) 11.5 - 14.5 % Final     Platelets   Date/Time Value Ref Range Status   11/03/2023 08:41  150 - 450 x10*3/uL Final   09/25/2023 09:10  150 - 450 x10E9/L Final   07/20/2023 09:20 AM CANCELED       Comment:     Result canceled by the ancillary.   07/20/2023 09:08  150 - 450 x10E9/L Final     No results found for: \"MPV\"  Neutrophils %   Date/Time Value Ref Range Status   11/03/2023 08:41 AM 70.2 40.0 - 80.0 % Final   05/25/2023 10:52 AM 74.5 40.0 - 80.0 % Final   03/15/2023 09:43 AM 66.9 40.0 - 80.0 % Final   12/15/2022 08:24 AM 71.1 40.0 - 80.0 % Final     Immature Granulocytes %, Automated   Date/Time Value Ref Range Status   11/03/2023 08:41 AM 5.0 (H) 0.0 - 0.9 % " Final     Comment:     Immature Granulocyte Count (IG) includes promyelocytes, myelocytes and metamyelocytes but does not include bands. Percent differential counts (%) should be interpreted in the context of the absolute cell counts (cells/UL).   09/25/2023 09:10 AM 6.9 (H) 0.0 - 0.9 % Final     Comment:      Immature Granulocyte Count (IG) includes promyelocytes,    myelocytes and metamyelocytes but does not include bands.   Percent differential counts (%) should be interpreted in the   context of the absolute cell counts (cells/L).     07/20/2023 09:08 AM 5.1 (H) 0.0 - 0.9 % Final     Comment:      Immature Granulocyte Count (IG) includes promyelocytes,    myelocytes and metamyelocytes but does not include bands.   Percent differential counts (%) should be interpreted in the   context of the absolute cell counts (cells/L).     05/25/2023 10:52 AM 4.4 (H) 0.0 - 0.9 % Final     Comment:      Immature Granulocyte Count (IG) includes promyelocytes,    myelocytes and metamyelocytes but does not include bands.   Percent differential counts (%) should be interpreted in the   context of the absolute cell counts (cells/L).       Lymphocytes %   Date/Time Value Ref Range Status   11/03/2023 08:41 AM 18.7 13.0 - 44.0 % Final   09/25/2023 09:10 AM 16.0 13.0 - 44.0 % Final   07/20/2023 09:08 AM 21.0 13.0 - 44.0 % Final   05/25/2023 10:52 AM 13.3 13.0 - 44.0 % Final   04/24/2023 08:28 AM 13.0 13.0 - 44.0 % Final     Monocytes %   Date/Time Value Ref Range Status   11/03/2023 08:41 AM 4.5 2.0 - 10.0 % Final   09/25/2023 09:10 AM 4.0 2.0 - 10.0 % Final   07/20/2023 09:08 AM 2.0 2.0 - 10.0 % Final   05/25/2023 10:52 AM 6.2 2.0 - 10.0 % Final   04/24/2023 08:28 AM 5.0 2.0 - 10.0 % Final     Eosinophils %   Date/Time Value Ref Range Status   11/03/2023 08:41 AM 1.1 0.0 - 6.0 % Final   09/25/2023 09:10 AM 1.0 0.0 - 6.0 % Final   07/20/2023 09:08 AM 0.0 0.0 - 6.0 % Final   05/25/2023 10:52 AM 0.8 0.0 - 6.0 % Final   04/24/2023 08:28  AM 1.0 0.0 - 6.0 % Final     Basophils %   Date/Time Value Ref Range Status   11/03/2023 08:41 AM 0.5 0.0 - 2.0 % Final   09/25/2023 09:10 AM 0.0 0.0 - 2.0 % Final   07/20/2023 09:08 AM 0.0 0.0 - 2.0 % Final   05/25/2023 10:52 AM 0.8 0.0 - 2.0 % Final   04/24/2023 08:28 AM 1.0 0.0 - 2.0 % Final     Neutrophils Absolute   Date/Time Value Ref Range Status   11/03/2023 08:41 AM 4.36 1.60 - 5.50 x10*3/uL Final     Comment:     Percent differential counts (%) should be interpreted in the context of the absolute cell counts (cells/uL).   05/25/2023 10:52 AM 5.53 (H) 1.60 - 5.50 x10E9/L Final   03/15/2023 09:43 AM 4.09 1.60 - 5.50 x10E9/L Final   12/15/2022 08:24 AM 5.85 (H) 1.60 - 5.50 x10E9/L Final     Immature Granulocytes Absolute, Automated   Date/Time Value Ref Range Status   11/03/2023 08:41 AM 0.31 0.00 - 0.50 x10*3/uL Final     Lymphocytes Absolute   Date/Time Value Ref Range Status   11/03/2023 08:41 AM 1.16 0.80 - 3.00 x10*3/uL Final   05/25/2023 10:52 AM 0.99 0.80 - 3.00 x10E9/L Final   03/15/2023 09:43 AM 1.24 0.80 - 3.00 x10E9/L Final   12/15/2022 08:24 AM 1.47 0.80 - 3.00 x10E9/L Final     Monocytes Absolute   Date/Time Value Ref Range Status   11/03/2023 08:41 AM 0.28 0.05 - 0.80 x10*3/uL Final   05/25/2023 10:52 AM 0.46 0.05 - 0.80 x10E9/L Final   03/15/2023 09:43 AM 0.35 0.05 - 0.80 x10E9/L Final   12/15/2022 08:24 AM 0.60 0.05 - 0.80 x10E9/L Final     Eosinophils Absolute   Date/Time Value Ref Range Status   11/03/2023 08:41 AM 0.07 0.00 - 0.40 x10*3/uL Final   09/25/2023 09:10 AM 0.08 0.00 - 0.40 x10E9/L Final   07/20/2023 09:08 AM 0.00 0.00 - 0.40 x10E9/L Final   05/25/2023 10:52 AM 0.06 0.00 - 0.40 x10E9/L Final   04/24/2023 08:28 AM 0.06 0.00 - 0.40 x10E9/L Final     Basophils Absolute   Date/Time Value Ref Range Status   11/03/2023 08:41 AM 0.03 0.00 - 0.10 x10*3/uL Final   09/25/2023 09:10 AM 0.00 0.00 - 0.10 x10E9/L Final   07/20/2023 09:08 AM 0.00 0.00 - 0.10 x10E9/L Final   05/25/2023 10:52 AM  "0.06 0.00 - 0.10 x10E9/L Final   04/24/2023 08:28 AM 0.06 0.00 - 0.10 x10E9/L Final       No components found for: \"PT\"  aPTT   Date/Time Value Ref Range Status   07/08/2018 06:50 AM 29 25 - 36 sec Final     Comment:       THE APTT IS NO LONGER USED FOR MONITORING     UNFRACTIONATED HEPARIN THERAPY.    FOR MONITORING HEPARIN THERAPY,     USE THE HEPARIN ASSAY.     06/14/2018 10:11 AM 29 25 - 36 sec Final     Comment:       THE APTT IS NO LONGER USED FOR MONITORING     UNFRACTIONATED HEPARIN THERAPY.    FOR MONITORING HEPARIN THERAPY,     USE THE HEPARIN ASSAY.         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   -currently on PO iron supplementation three times per week  -labs done on 11/3/2023 included CBC, COMP, iron panel + ferritin  -results reviewed--wbc 6.2, hgb 10.8, plt 212,000, creatinine 1.59, AST 20, ALT 15, ferritin 69, TIBC 307, sat 27%  -no evidence of iron overload  -hgb also too high to qualify for Gopi  -will continue to follow Q4 months     2) hypertension  -on amlodipine  -on olmesartan     3) hyperlipidemia  -on atorvastatin     4) BPH  -on flomax  -on cardura     5) renal carcinoma  -6/28/2018 left partial nephrectomy: renal cell carcinoma, 2.7cm mass; pT1aNx; margins uninvolved; ISUP nuclear grade 2    6) hereditary hemochromatosis  -used to undergo regular therapeutic phlebotomy  -however, over time has become too anemic to be phlebotomized any more      Problem List Items Addressed This Visit    None           Giovani Brito MD                         "

## 2024-01-03 ENCOUNTER — OFFICE VISIT (OUTPATIENT)
Dept: CARDIOLOGY | Facility: CLINIC | Age: 84
End: 2024-01-03
Payer: MEDICARE

## 2024-01-03 VITALS
BODY MASS INDEX: 29.13 KG/M2 | DIASTOLIC BLOOD PRESSURE: 60 MMHG | HEART RATE: 96 BPM | WEIGHT: 203 LBS | SYSTOLIC BLOOD PRESSURE: 130 MMHG

## 2024-01-03 DIAGNOSIS — E78.2 MIXED HYPERLIPIDEMIA: ICD-10-CM

## 2024-01-03 DIAGNOSIS — N18.30 STAGE 3 CHRONIC KIDNEY DISEASE, UNSPECIFIED WHETHER STAGE 3A OR 3B CKD (MULTI): ICD-10-CM

## 2024-01-03 DIAGNOSIS — I25.10 ARTERIOSCLEROSIS OF CORONARY ARTERY: ICD-10-CM

## 2024-01-03 DIAGNOSIS — Z01.810 PREOPERATIVE CARDIOVASCULAR EXAMINATION: Primary | ICD-10-CM

## 2024-01-03 DIAGNOSIS — I10 PRIMARY HYPERTENSION: ICD-10-CM

## 2024-01-03 DIAGNOSIS — Z90.5 SINGLE KIDNEY: ICD-10-CM

## 2024-01-03 DIAGNOSIS — Z98.62 STATUS POST ANGIOPLASTY: ICD-10-CM

## 2024-01-03 PROCEDURE — 93000 ELECTROCARDIOGRAM COMPLETE: CPT | Performed by: INTERNAL MEDICINE

## 2024-01-03 PROCEDURE — 1036F TOBACCO NON-USER: CPT | Performed by: INTERNAL MEDICINE

## 2024-01-03 PROCEDURE — 3078F DIAST BP <80 MM HG: CPT | Performed by: INTERNAL MEDICINE

## 2024-01-03 PROCEDURE — 1160F RVW MEDS BY RX/DR IN RCRD: CPT | Performed by: INTERNAL MEDICINE

## 2024-01-03 PROCEDURE — 1126F AMNT PAIN NOTED NONE PRSNT: CPT | Performed by: INTERNAL MEDICINE

## 2024-01-03 PROCEDURE — 3075F SYST BP GE 130 - 139MM HG: CPT | Performed by: INTERNAL MEDICINE

## 2024-01-03 PROCEDURE — 1159F MED LIST DOCD IN RCRD: CPT | Performed by: INTERNAL MEDICINE

## 2024-01-03 PROCEDURE — 99214 OFFICE O/P EST MOD 30 MIN: CPT | Performed by: INTERNAL MEDICINE

## 2024-01-03 RX ORDER — ATORVASTATIN CALCIUM 20 MG/1
20 TABLET, FILM COATED ORAL DAILY
Qty: 90 TABLET | Refills: 3 | Status: SHIPPED | OUTPATIENT
Start: 2024-01-03 | End: 2025-01-02

## 2024-01-03 RX ORDER — OLMESARTAN MEDOXOMIL 40 MG/1
40 TABLET ORAL DAILY
Qty: 90 TABLET | Refills: 3 | Status: SHIPPED | OUTPATIENT
Start: 2024-01-03 | End: 2025-01-02

## 2024-01-03 RX ORDER — AMLODIPINE BESYLATE 5 MG/1
5 TABLET ORAL DAILY
Qty: 90 TABLET | Refills: 3 | Status: SHIPPED | OUTPATIENT
Start: 2024-01-03 | End: 2025-01-02

## 2024-01-03 RX ORDER — DOXAZOSIN 1 MG/1
1 TABLET ORAL DAILY
Qty: 90 TABLET | Refills: 3 | Status: SHIPPED | OUTPATIENT
Start: 2024-01-03

## 2024-01-03 NOTE — PROGRESS NOTES
Patient with multiple comorbidities as noted below, including atherosclerotic native vessel coronary artery disease, hypertension, dyslipidemia, presents for preoperative cardiac risk assessment for umbilical hernia repair.      Subjective :     Interval review of systems is negative for chest discomfort pressure tightness heaviness palpitations lightheadedness orthopnea paroxysmal nocturnal dyspnea dependent edema or claudication TIA or CVA type symptoms or bleeding diathesis    Patient has had left kidney removal and inguinal hernia surgery in the last few years without any untoward events.      History so Far :  1. Hypertension -   2. Atherosclerotic native vessel coronary artery disease with  percutaneous coronary intervention and stenting of an obtuse marginal  branch in February of 2010 with a 2.75 x 13 mm Cypher drug-eluting  stent. Functional class I  3. Perfusion imaging April 2014, was compromised by shifting  diaphragm attenuation artifact, could not exclude mild inferolateral  ischemia.  4. Perfusion imaging, November 2016, left ventricular ejection  fraction 68%, normal perfusion and TID ratio 0.774. Patient is not interested in pursuing another perfusion study at this time.  5.renal cell carcinoma, requiring partial left  nephrectomy.  6. Upper gastrointestinal bleeding, esophageal ulcer, transient  interruption of anti-platelet therapy, aspirin now resumed.  7. Polycythemia vera, apparently resolved, no longer on hydroxyurea  8. Kidney disease, stage III.  9..s/p partial left nephrectomy for renal cell carcinoma in 2018  10.H/O UGI bleed 2018,EGD showed duodenal ulcer,did not require PRBCs.   11. Stress Myoview December 2022-3.6 METS, 88% age-predicted maximum heart rate, modified Williams protocol, terminated due to fatigue and shortness of breath. Mild fixed perfusion abnormality involving the mid to distal septum with normal wall motion suggesting diaphragm attenuation artifact. LVEF 60% transient  ischemic dilatation 1.01 which is normal  12. Echocardiogram December 2022-LVEF 65% borderline left ventricular hypertrophy impaired diastolic filling mild mitral insufficiency no significant change compared to echo of 2010 left atrial diameter 4.3 cm left atrial volume index 19 mL/mÂ² which seems to be an underestimate   13. Chronic kidney disease stage IIIa GFR 44, July 2023  14. Systolic murmur left sternal border    Objective      Wt Readings from Last 3 Encounters:   01/03/24 92.1 kg (203 lb)   11/06/23 92.9 kg (204 lb 12.9 oz)   07/25/23 91.6 kg (202 lb)            Physical Exam:    Patient is awake alert oriented x3, no acute distress  Lungs are clear  Heart sounds are regular with grade 1/6 to 2/6 crescendo decrescendo murmur along the left sternal border.  Extremities show no edema  Ambulates without assistance.    Meds:  Current Outpatient Medications   Medication Instructions    allopurinol (Zyloprim) 100 mg tablet 1 tablet, oral, 2 times daily    ALPRAZolam (XANAX) 0.5 mg, oral, 3 times daily PRN    amLODIPine (Norvasc) 5 mg tablet 1 tablet, oral, Daily    aspirin 81 mg EC tablet 1 tablet, oral, Daily    atorvastatin (Lipitor) 20 mg tablet 1 tablet, oral, Daily    cholecalciferol (Vitamin D-3) 50 mcg (2,000 unit) capsule 1 capsule, oral, Daily    doxazosin (CARDURA) 1 mg, oral, Daily    ferrous sulfate (IRON ORAL) 320 mg, oral, Daily    nitroglycerin (NITROSTAT) 0.4 mg, sublingual, Every 5 min PRN, CALL 911 IF PAIN PERSISTS.    olmesartan (Benicar) 40 mg tablet 1 tablet, oral, Daily    pantoprazole (ProtoNix) 40 mg EC tablet 1 tablet, oral, Daily    tamsulosin (Flomax) 0.4 mg 24 hr capsule 1 capsule, oral, Daily    vit C,U-Gv-jjqlp-lutein-zeaxan (PreserVision AREDS-2) 250-90-40-1 mg capsule oral, Daily          Allergies   Allergen Reactions    Other Unknown     Bee sting         LABS:    Lab Results   Component Value Date    WBC 6.2 11/03/2023    HGB 10.8 (L) 11/03/2023    HCT 33.1 (L) 11/03/2023    PLT  212 11/03/2023    CHOL 90 12/15/2022    TRIG 112 12/15/2022    HDL 20.7 (A) 12/15/2022    ALT 15 11/03/2023    AST 20 11/03/2023     11/03/2023    K 4.1 11/03/2023     11/03/2023    CREATININE 1.59 (H) 11/03/2023    BUN 20 11/03/2023    CO2 30 11/03/2023    PSA 0.74 09/25/2023    INR 1.5 (H) 07/08/2018                   Problem List:    Patient Active Problem List    Diagnosis Date Noted    Hereditary hemochromatosis (CMS/[facility]) 11/06/2023    Anemia 09/18/2023    Arteriosclerosis of coronary artery 09/18/2023    BPH with urinary obstruction 09/18/2023    Carcinoma of left kidney (CMS/[facility]) 09/18/2023    CKD (chronic kidney disease), stage III (CMS/[facility]) 09/18/2023    Elevated serum creatinine 09/18/2023    Hyperlipidemia 09/18/2023    Hypertension 09/18/2023    Nephrolithiasis 09/18/2023    Polycythemia 09/18/2023    Premature ventricular contraction 09/18/2023    Raynaud's phenomenon without gangrene 09/18/2023    Renal mass, left 09/18/2023    Single kidney 09/18/2023    Retention of urine 09/18/2023    Shortness of breath 09/18/2023    Status post angioplasty 09/18/2023    Systolic ejection murmur 09/18/2023    Former smoker 09/18/2023    Overweight with body mass index (BMI) of 28 to 28.9 in adult 09/18/2023                 Assessment:  Patient's cardiac risk for upcoming umbilical hernia surgery is considered acceptable and may proceed as planned without further cardiac testing  It is reasonable to withhold antiplatelet therapy as needed for 5 to 7 days prior to procedure and resume when feasible after procedure  Hypertension-at target  Chronic kidney disease stage IIIa-stable  Hyperlipidemia-no recent lipid profile  Atherosclerotic native vessel coronary artery disease-class I     Recommendations:  1.  No change in cardiac medications at this time  2.  Follow-up July 2024  3.  Comprehensive profile lipid profile and CBC prior to next visit    Follow up : 6 months  Provider Attestation - Cherise  documentation    All medical record entries made by the Scribe were at my direction and personally dictated by me. I have reviewed the chart and agree that the record accurately reflects my personal performance of the history, physical exam, discussion and plan.    DIANE Zuniga

## 2024-02-29 ENCOUNTER — LAB (OUTPATIENT)
Dept: LAB | Facility: CLINIC | Age: 84
End: 2024-02-29
Payer: MEDICARE

## 2024-02-29 DIAGNOSIS — D50.8 OTHER IRON DEFICIENCY ANEMIA: ICD-10-CM

## 2024-02-29 DIAGNOSIS — E83.110 HEREDITARY HEMOCHROMATOSIS (CMS-HCC): ICD-10-CM

## 2024-02-29 DIAGNOSIS — I12.9 HYPERTENSIVE CHRONIC KIDNEY DISEASE WITH STAGE 1 THROUGH STAGE 4 CHRONIC KIDNEY DISEASE, OR UNSPECIFIED CHRONIC KIDNEY DISEASE: ICD-10-CM

## 2024-02-29 DIAGNOSIS — N18.32 CHRONIC KIDNEY DISEASE, STAGE 3B (MULTI): Primary | ICD-10-CM

## 2024-02-29 DIAGNOSIS — M10.49 OTHER SECONDARY GOUT, MULTIPLE SITES: ICD-10-CM

## 2024-02-29 DIAGNOSIS — D63.1 ANEMIA IN CHRONIC KIDNEY DISEASE (CODE): ICD-10-CM

## 2024-02-29 DIAGNOSIS — N18.32 CHRONIC KIDNEY DISEASE, STAGE 3B (MULTI): ICD-10-CM

## 2024-02-29 DIAGNOSIS — Z13.89 ENCOUNTER FOR SCREENING FOR OTHER DISORDER: ICD-10-CM

## 2024-02-29 LAB
ACANTHOCYTES BLD QL SMEAR: ABNORMAL
ALBUMIN SERPL BCP-MCNC: 4.6 G/DL (ref 3.4–5)
ALP SERPL-CCNC: 65 U/L (ref 33–136)
ALT SERPL W P-5'-P-CCNC: 15 U/L (ref 10–52)
ANION GAP SERPL CALC-SCNC: 12 MMOL/L (ref 10–20)
AST SERPL W P-5'-P-CCNC: 20 U/L (ref 9–39)
BASOPHILS # BLD MANUAL: 0 X10*3/UL (ref 0–0.1)
BASOPHILS NFR BLD MANUAL: 0 %
BILIRUB SERPL-MCNC: 0.8 MG/DL (ref 0–1.2)
BUN SERPL-MCNC: 25 MG/DL (ref 6–23)
CALCIUM SERPL-MCNC: 10 MG/DL (ref 8.6–10.3)
CHLORIDE SERPL-SCNC: 103 MMOL/L (ref 98–107)
CO2 SERPL-SCNC: 29 MMOL/L (ref 21–32)
CREAT SERPL-MCNC: 1.56 MG/DL (ref 0.5–1.3)
DACRYOCYTES BLD QL SMEAR: ABNORMAL
EGFRCR SERPLBLD CKD-EPI 2021: 44 ML/MIN/1.73M*2
EOSINOPHIL # BLD MANUAL: 0 X10*3/UL (ref 0–0.4)
EOSINOPHIL NFR BLD MANUAL: 0 %
ERYTHROCYTE [DISTWIDTH] IN BLOOD BY AUTOMATED COUNT: 20.1 % (ref 11.5–14.5)
FERRITIN SERPL-MCNC: 132 NG/ML (ref 20–300)
GLUCOSE SERPL-MCNC: 107 MG/DL (ref 74–99)
HCT VFR BLD AUTO: 34.3 % (ref 41–52)
HGB BLD-MCNC: 11.2 G/DL (ref 13.5–17.5)
IMM GRANULOCYTES # BLD AUTO: 0.46 X10*3/UL (ref 0–0.5)
IMM GRANULOCYTES NFR BLD AUTO: 5.9 % (ref 0–0.9)
IRON SATN MFR SERPL: 30 % (ref 25–45)
IRON SERPL-MCNC: 98 UG/DL (ref 35–150)
LYMPHOCYTES # BLD MANUAL: 1.01 X10*3/UL (ref 0.8–3)
LYMPHOCYTES NFR BLD MANUAL: 13 %
MCH RBC QN AUTO: 32.3 PG (ref 26–34)
MCHC RBC AUTO-ENTMCNC: 32.7 G/DL (ref 32–36)
MCV RBC AUTO: 99 FL (ref 80–100)
METAMYELOCYTES # BLD MANUAL: 0.08 X10*3/UL
METAMYELOCYTES NFR BLD MANUAL: 1 %
MONOCYTES # BLD MANUAL: 0.39 X10*3/UL (ref 0.05–0.8)
MONOCYTES NFR BLD MANUAL: 5 %
NEUTROPHILS # BLD MANUAL: 6.24 X10*3/UL (ref 1.6–5.5)
NEUTS BAND # BLD MANUAL: 2.11 X10*3/UL (ref 0–0.5)
NEUTS BAND NFR BLD MANUAL: 27 %
NEUTS SEG # BLD MANUAL: 4.13 X10*3/UL (ref 1.6–5)
NEUTS SEG NFR BLD MANUAL: 53 %
NRBC BLD MANUAL-RTO: 1 % (ref 0–0)
NRBC BLD-RTO: ABNORMAL /100{WBCS}
OVALOCYTES BLD QL SMEAR: ABNORMAL
PHOSPHATE SERPL-MCNC: 3.4 MG/DL (ref 2.5–4.9)
PLATELET # BLD AUTO: 215 X10*3/UL (ref 150–450)
POLYCHROMASIA BLD QL SMEAR: ABNORMAL
POTASSIUM SERPL-SCNC: 4.5 MMOL/L (ref 3.5–5.3)
PROT SERPL-MCNC: 7.3 G/DL (ref 6.4–8.2)
PTH-INTACT SERPL-MCNC: 40.4 PG/ML (ref 18.5–88)
RBC # BLD AUTO: 3.47 X10*6/UL (ref 4.5–5.9)
RBC MORPH BLD: ABNORMAL
SODIUM SERPL-SCNC: 139 MMOL/L (ref 136–145)
TIBC SERPL-MCNC: 326 UG/DL (ref 240–445)
TOTAL CELLS COUNTED BLD: 100
UIBC SERPL-MCNC: 228 UG/DL (ref 110–370)
URATE SERPL-MCNC: 5.7 MG/DL (ref 4–7.5)
VARIANT LYMPHS # BLD MANUAL: 0.08 X10*3/UL (ref 0–0.3)
VARIANT LYMPHS NFR BLD: 1 %
WBC # BLD AUTO: 7.8 X10*3/UL (ref 4.4–11.3)

## 2024-02-29 PROCEDURE — 84550 ASSAY OF BLOOD/URIC ACID: CPT | Performed by: INTERNAL MEDICINE

## 2024-02-29 PROCEDURE — 84100 ASSAY OF PHOSPHORUS: CPT | Performed by: INTERNAL MEDICINE

## 2024-02-29 PROCEDURE — 36415 COLL VENOUS BLD VENIPUNCTURE: CPT

## 2024-02-29 PROCEDURE — 83540 ASSAY OF IRON: CPT | Performed by: INTERNAL MEDICINE

## 2024-02-29 PROCEDURE — 82728 ASSAY OF FERRITIN: CPT | Performed by: INTERNAL MEDICINE

## 2024-02-29 PROCEDURE — 80053 COMPREHEN METABOLIC PANEL: CPT

## 2024-02-29 PROCEDURE — 85007 BL SMEAR W/DIFF WBC COUNT: CPT

## 2024-02-29 PROCEDURE — 83970 ASSAY OF PARATHORMONE: CPT | Performed by: INTERNAL MEDICINE

## 2024-02-29 PROCEDURE — 85027 COMPLETE CBC AUTOMATED: CPT

## 2024-03-02 ASSESSMENT — ENCOUNTER SYMPTOMS
CARDIOVASCULAR NEGATIVE: 1
RESPIRATORY NEGATIVE: 1
PSYCHIATRIC NEGATIVE: 1
EYES NEGATIVE: 1
MUSCULOSKELETAL NEGATIVE: 1
CONSTITUTIONAL NEGATIVE: 1
NEUROLOGICAL NEGATIVE: 1
HEMATOLOGIC/LYMPHATIC NEGATIVE: 1
ENDOCRINE NEGATIVE: 1

## 2024-03-03 NOTE — PROGRESS NOTES
Patient ID: Arnoldo Gracia is a 83 y.o. male.  Referring Physician: Giovani Brito MD  [address] [facility] [address]  [place] [id number]  Primary Care Provider: Zuleyka Kumar MD  Visit Type: Follow Up      Subjective    HPI       Patient presents for follow up to review recent lab work. He reports he has been doing well, he had a hernia repair abut the beginning of February and says he tolerated well. He reports taking oral iron every other day. He says it causes mild constipation but he manages with diet mostly. He says his energy is good and has a normal appetite and eats a balanced diet with no alcohol intake. He denies fevers, chills, nausea, vomiting, melena, SOB, chest pain, or edema.       Review of Systems   Constitutional: Negative.    HENT:  Negative.     Eyes: Negative.    Respiratory: Negative.     Cardiovascular: Negative.    Gastrointestinal:  Positive for constipation (intermittent mild constipation related to iron intake).   Endocrine: Negative.    Genitourinary: Negative.     Musculoskeletal: Negative.    Skin: Negative.    Neurological: Negative.    Hematological: Negative.    Psychiatric/Behavioral: Negative.        Past Medical History : coronary artery disease, hypertension, osteoarthritis, hyperlipidemia, GERD, depression, former smoker, NAY     Past Surgical History: left partial nephrectomy 2018 2/2 kidney cancer     Objective   Visit Vitals  /72 (BP Location: Right arm)   Pulse 98   Temp 36.2 °C (97.2 °F) (Temporal)   Resp 18   Wt 91.2 kg (201 lb 1 oz)   SpO2 97%   BMI 28.85 kg/m²   Smoking Status Never   BSA 2.12 m²          has a past medical history of Essential (primary) hypertension, Old myocardial infarction, Personal history of diseases of the blood and blood-forming organs and certain disorders involving the immune mechanism, Personal history of other diseases of the circulatory system, Personal history of other diseases of the circulatory system, Personal history of other  endocrine, nutritional and metabolic disease, Personal history of other specified conditions, and Personal history of other specified conditions.   has a past surgical history that includes Other surgical history (04/11/2018); Other surgical history (11/07/2021); Other surgical history (11/07/2021); Other surgical history (11/07/2021); Other surgical history (11/07/2021); Other surgical history (11/07/2021); Other surgical history (11/14/2018); Other surgical history (11/14/2018); Other surgical history (11/14/2018); and Other surgical history (11/14/2018).  Family History   Problem Relation Name Age of Onset    Hypertension Mother      Other (cardiac disorder) Mother      Other (cardiac disorder) Father      Emphysema Father      Heart attack Father      Colon cancer Sister      Stomach cancer Sister       Oncology History    No history exists.       Arnoldo Gracia  reports that he has never smoked. He has never used smokeless tobacco.  He  reports no history of alcohol use.  He  reports no history of drug use.    Physical Exam  Vitals reviewed.   HENT:      Head: Normocephalic.      Mouth/Throat:      Mouth: Mucous membranes are moist.   Eyes:      Extraocular Movements: Extraocular movements intact.      Pupils: Pupils are equal, round, and reactive to light.   Cardiovascular:      Rate and Rhythm: Normal rate and regular rhythm.      Heart sounds: Normal heart sounds.   Pulmonary:      Breath sounds: Normal breath sounds.   Abdominal:      General: Bowel sounds are normal.      Palpations: Abdomen is soft.   Musculoskeletal:         General: Normal range of motion.      Cervical back: Normal range of motion and neck supple.   Skin:     General: Skin is warm and dry.   Neurological:      General: No focal deficit present.      Mental Status: He is alert.   Psychiatric:         Mood and Affect: Mood normal.         Labs:  Lab Results   Component Value Date    WBC 7.8 02/29/2024    NEUTROABS 4.36 11/03/2023     "IGABSOL 0.46 02/29/2024    LYMPHSABS 1.16 11/03/2023    MONOSABS 0.28 11/03/2023    EOSABS 0.00 02/29/2024    BASOSABS 0.00 02/29/2024    RBC 3.47 (L) 02/29/2024    MCV 99 02/29/2024    MCHC 32.7 02/29/2024    HGB 11.2 (L) 02/29/2024    HCT 34.3 (L) 02/29/2024     02/29/2024     Lab Results   Component Value Date    CREATININE 1.56 (H) 02/29/2024    BUN 25 (H) 02/29/2024    EGFR 44 (L) 02/29/2024     02/29/2024    K 4.5 02/29/2024     02/29/2024    CO2 29 02/29/2024      Lab Results   Component Value Date    ALT 15 02/29/2024    AST 20 02/29/2024    ALKPHOS 65 02/29/2024    BILITOT 0.8 02/29/2024      Lab Results   Component Value Date    IRON 98 02/29/2024    TIBC 326 02/29/2024    FERRITIN 132 02/29/2024      No results found for: \"HCYIXHCZ32\"   No results found for: \"FOLATE\"      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   -currently on PO iron supplementation three times per week  -labs done on 11/3/2023 included CBC, COMP, iron panel + ferritin  -results reviewed--wbc 6.2, hgb 10.8, plt 212,000, creatinine 1.59, AST 20, ALT 15, ferritin 69, TIBC 307, sat 27%  -no evidence of iron overload  -hgb also too high to qualify for Gopi  -will continue to follow Q4 months    3/4/2024:  - Originally 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 for the JAK2 V617F mutation on September 19, 2013 at [facility] and was treated with phlebotomies but difficult IV access  - Began Hydrea about 11/2013 and has been off since about December of 2020 due to anemia  - Established care with our institution about 2016  - Recently underwent umbilical hernia repair on 2/1/2024 with [facility]   - Labs done on 2/29/24 show: WBC 7.8, hgb 11.2, MCV 99, plt 215,000, creatinine 1.56, GFR 44, iron saturation 30%, ferritin 132  - Since his iron saturation and ferritin are both adequate he will decrease his oral iron tab to twice a week from " every other day  - He denies signs/symptoms of bleeding   - He is feeling well with good energy  - He will RTC in 4 months with Dr. Brito with repeat labs a few days prior   - He was instructed to call the office if he has any changes in his symptoms or concerns in the meantime      2) hypertension  - follows with cardiology   -on amlodipine  -on olmesartan     3) hyperlipidemia  -on atorvastatin     4) BPH  -on flomax  -on cardura     5) renal carcinoma  -6/28/2018 left partial nephrectomy: renal cell carcinoma, 2.7cm mass; pT1aNx; margins uninvolved; ISUP nuclear grade 2    6) hereditary hemochromatosis  -used to undergo regular therapeutic phlebotomy  -however, over time has become too anemic to be phlebotomized any more    Diagnoses and all orders for this visit:  Hereditary hemochromatosis (CMS/HCC)  -     Clinic Appointment Request Follow Up; NEPTALI KEE; [facility]  Other iron deficiency anemia  -     Clinic Appointment Request CAMI BRITO; Future  -     CBC and Auto Differential; Future  -     Ferritin; Future  -     Iron and TIBC; Future  -     Clinic Appointment Request Follow Up; NEPTALI KEE; [facility]        Neptali Kee, VICKIE-CNP

## 2024-03-04 ENCOUNTER — OFFICE VISIT (OUTPATIENT)
Dept: HEMATOLOGY/ONCOLOGY | Facility: CLINIC | Age: 84
End: 2024-03-04
Payer: MEDICARE

## 2024-03-04 VITALS
TEMPERATURE: 97.2 F | OXYGEN SATURATION: 97 % | HEART RATE: 98 BPM | WEIGHT: 201.06 LBS | BODY MASS INDEX: 28.85 KG/M2 | RESPIRATION RATE: 18 BRPM | DIASTOLIC BLOOD PRESSURE: 72 MMHG | SYSTOLIC BLOOD PRESSURE: 149 MMHG

## 2024-03-04 DIAGNOSIS — D50.8 OTHER IRON DEFICIENCY ANEMIA: ICD-10-CM

## 2024-03-04 DIAGNOSIS — E83.110 HEREDITARY HEMOCHROMATOSIS (CMS-HCC): ICD-10-CM

## 2024-03-04 PROCEDURE — 1126F AMNT PAIN NOTED NONE PRSNT: CPT

## 2024-03-04 PROCEDURE — 1036F TOBACCO NON-USER: CPT

## 2024-03-04 PROCEDURE — 1160F RVW MEDS BY RX/DR IN RCRD: CPT

## 2024-03-04 PROCEDURE — 1159F MED LIST DOCD IN RCRD: CPT

## 2024-03-04 PROCEDURE — 3077F SYST BP >= 140 MM HG: CPT

## 2024-03-04 PROCEDURE — 99214 OFFICE O/P EST MOD 30 MIN: CPT

## 2024-03-04 PROCEDURE — 3078F DIAST BP <80 MM HG: CPT

## 2024-03-04 ASSESSMENT — ENCOUNTER SYMPTOMS: CONSTIPATION: 1

## 2024-03-04 ASSESSMENT — PAIN SCALES - GENERAL: PAINLEVEL: 0-NO PAIN

## 2024-06-28 ENCOUNTER — LAB (OUTPATIENT)
Dept: LAB | Facility: CLINIC | Age: 84
End: 2024-06-28
Payer: MEDICARE

## 2024-06-28 DIAGNOSIS — N18.32 CHRONIC KIDNEY DISEASE, STAGE 3B (MULTI): Primary | ICD-10-CM

## 2024-06-28 DIAGNOSIS — I25.10 ARTERIOSCLEROSIS OF CORONARY ARTERY: ICD-10-CM

## 2024-06-28 DIAGNOSIS — Z98.62 STATUS POST ANGIOPLASTY: ICD-10-CM

## 2024-06-28 DIAGNOSIS — Z90.5 SINGLE KIDNEY: ICD-10-CM

## 2024-06-28 DIAGNOSIS — D50.8 OTHER IRON DEFICIENCY ANEMIA: ICD-10-CM

## 2024-06-28 DIAGNOSIS — E78.2 MIXED HYPERLIPIDEMIA: ICD-10-CM

## 2024-06-28 DIAGNOSIS — Z01.810 PREOPERATIVE CARDIOVASCULAR EXAMINATION: ICD-10-CM

## 2024-06-28 DIAGNOSIS — N18.30 STAGE 3 CHRONIC KIDNEY DISEASE, UNSPECIFIED WHETHER STAGE 3A OR 3B CKD (MULTI): ICD-10-CM

## 2024-06-28 DIAGNOSIS — I10 PRIMARY HYPERTENSION: ICD-10-CM

## 2024-06-28 LAB
ACANTHOCYTES BLD QL SMEAR: ABNORMAL
ALBUMIN SERPL BCP-MCNC: 4.4 G/DL (ref 3.4–5)
ALP SERPL-CCNC: 60 U/L (ref 33–136)
ALT SERPL W P-5'-P-CCNC: 14 U/L (ref 10–52)
ANION GAP SERPL CALC-SCNC: 10 MMOL/L (ref 10–20)
AST SERPL W P-5'-P-CCNC: 22 U/L (ref 9–39)
BASO STIPL BLD QL SMEAR: PRESENT
BASOPHILS # BLD MANUAL: 0.06 X10*3/UL (ref 0–0.1)
BASOPHILS NFR BLD MANUAL: 1 %
BILIRUB SERPL-MCNC: 0.7 MG/DL (ref 0–1.2)
BUN SERPL-MCNC: 18 MG/DL (ref 6–23)
CALCIUM SERPL-MCNC: 9.6 MG/DL (ref 8.6–10.3)
CHLORIDE SERPL-SCNC: 102 MMOL/L (ref 98–107)
CHOLEST SERPL-MCNC: 93 MG/DL (ref 0–199)
CHOLESTEROL/HDL RATIO: 5.3
CO2 SERPL-SCNC: 29 MMOL/L (ref 21–32)
CREAT SERPL-MCNC: 1.4 MG/DL (ref 0.5–1.3)
DACRYOCYTES BLD QL SMEAR: ABNORMAL
EGFRCR SERPLBLD CKD-EPI 2021: 50 ML/MIN/1.73M*2
EOSINOPHIL # BLD MANUAL: 0 X10*3/UL (ref 0–0.4)
EOSINOPHIL NFR BLD MANUAL: 0 %
ERYTHROCYTE [DISTWIDTH] IN BLOOD BY AUTOMATED COUNT: 20.9 % (ref 11.5–14.5)
FERRITIN SERPL-MCNC: 91 NG/ML (ref 20–300)
GIANT PLATELETS BLD QL SMEAR: ABNORMAL
GLUCOSE SERPL-MCNC: 103 MG/DL (ref 74–99)
HCT VFR BLD AUTO: 31.1 % (ref 41–52)
HDLC SERPL-MCNC: 17.5 MG/DL
HGB BLD-MCNC: 10.1 G/DL (ref 13.5–17.5)
IMM GRANULOCYTES # BLD AUTO: 0.29 X10*3/UL (ref 0–0.5)
IMM GRANULOCYTES NFR BLD AUTO: 5.2 % (ref 0–0.9)
IRON SATN MFR SERPL: 23 % (ref 25–45)
IRON SERPL-MCNC: 68 UG/DL (ref 35–150)
LDLC SERPL CALC-MCNC: 43 MG/DL
LYMPHOCYTES # BLD MANUAL: 1.1 X10*3/UL (ref 0.8–3)
LYMPHOCYTES NFR BLD MANUAL: 20 %
MCH RBC QN AUTO: 32.1 PG (ref 26–34)
MCHC RBC AUTO-ENTMCNC: 32.5 G/DL (ref 32–36)
MCV RBC AUTO: 99 FL (ref 80–100)
MONOCYTES # BLD MANUAL: 0.11 X10*3/UL (ref 0.05–0.8)
MONOCYTES NFR BLD MANUAL: 2 %
MYELOCYTES # BLD MANUAL: 0.06 X10*3/UL
MYELOCYTES NFR BLD MANUAL: 1 %
NEUTROPHILS # BLD MANUAL: 4.19 X10*3/UL (ref 1.6–5.5)
NEUTS BAND # BLD MANUAL: 0.28 X10*3/UL (ref 0–0.5)
NEUTS BAND NFR BLD MANUAL: 5 %
NEUTS SEG # BLD MANUAL: 3.91 X10*3/UL (ref 1.6–5)
NEUTS SEG NFR BLD MANUAL: 71 %
NON HDL CHOLESTEROL: 76 MG/DL (ref 0–149)
NRBC BLD MANUAL-RTO: 1 % (ref 0–0)
NRBC BLD-RTO: ABNORMAL /100{WBCS}
OVALOCYTES BLD QL SMEAR: ABNORMAL
PHOSPHATE SERPL-MCNC: 3.4 MG/DL (ref 2.5–4.9)
PLATELET # BLD AUTO: 220 X10*3/UL (ref 150–450)
POLYCHROMASIA BLD QL SMEAR: ABNORMAL
POTASSIUM SERPL-SCNC: 4.2 MMOL/L (ref 3.5–5.3)
PROT SERPL-MCNC: 6.7 G/DL (ref 6.4–8.2)
PTH-INTACT SERPL-MCNC: 46.5 PG/ML (ref 18.5–88)
RBC # BLD AUTO: 3.15 X10*6/UL (ref 4.5–5.9)
RBC MORPH BLD: ABNORMAL
SODIUM SERPL-SCNC: 137 MMOL/L (ref 136–145)
TIBC SERPL-MCNC: 293 UG/DL (ref 240–445)
TOTAL CELLS COUNTED BLD: 100
TRIGL SERPL-MCNC: 164 MG/DL (ref 0–149)
UIBC SERPL-MCNC: 225 UG/DL (ref 110–370)
URATE SERPL-MCNC: 5.4 MG/DL (ref 4–7.5)
VLDL: 33 MG/DL (ref 0–40)
WBC # BLD AUTO: 5.5 X10*3/UL (ref 4.4–11.3)

## 2024-06-28 PROCEDURE — 82728 ASSAY OF FERRITIN: CPT | Performed by: INTERNAL MEDICINE

## 2024-06-28 PROCEDURE — 85027 COMPLETE CBC AUTOMATED: CPT

## 2024-06-28 PROCEDURE — 84100 ASSAY OF PHOSPHORUS: CPT | Performed by: INTERNAL MEDICINE

## 2024-06-28 PROCEDURE — 84075 ASSAY ALKALINE PHOSPHATASE: CPT

## 2024-06-28 PROCEDURE — 83970 ASSAY OF PARATHORMONE: CPT | Performed by: INTERNAL MEDICINE

## 2024-06-28 PROCEDURE — 84550 ASSAY OF BLOOD/URIC ACID: CPT | Performed by: INTERNAL MEDICINE

## 2024-06-28 PROCEDURE — 83718 ASSAY OF LIPOPROTEIN: CPT | Performed by: INTERNAL MEDICINE

## 2024-06-28 PROCEDURE — 85007 BL SMEAR W/DIFF WBC COUNT: CPT

## 2024-06-28 PROCEDURE — 83540 ASSAY OF IRON: CPT | Performed by: INTERNAL MEDICINE

## 2024-06-28 PROCEDURE — 36415 COLL VENOUS BLD VENIPUNCTURE: CPT

## 2024-07-02 ENCOUNTER — OFFICE VISIT (OUTPATIENT)
Dept: HEMATOLOGY/ONCOLOGY | Facility: CLINIC | Age: 84
End: 2024-07-02
Payer: MEDICARE

## 2024-07-02 VITALS
RESPIRATION RATE: 16 BRPM | SYSTOLIC BLOOD PRESSURE: 174 MMHG | HEART RATE: 97 BPM | BODY MASS INDEX: 28.75 KG/M2 | OXYGEN SATURATION: 97 % | TEMPERATURE: 97.3 F | DIASTOLIC BLOOD PRESSURE: 62 MMHG | WEIGHT: 200.4 LBS

## 2024-07-02 DIAGNOSIS — E83.110 HEREDITARY HEMOCHROMATOSIS (CMS-HCC): ICD-10-CM

## 2024-07-02 DIAGNOSIS — E78.2 MIXED HYPERLIPIDEMIA: ICD-10-CM

## 2024-07-02 DIAGNOSIS — I10 PRIMARY HYPERTENSION: Primary | ICD-10-CM

## 2024-07-02 DIAGNOSIS — N40.1 BPH WITH URINARY OBSTRUCTION: ICD-10-CM

## 2024-07-02 DIAGNOSIS — C64.2 CARCINOMA OF LEFT KIDNEY (MULTI): ICD-10-CM

## 2024-07-02 DIAGNOSIS — D50.8 OTHER IRON DEFICIENCY ANEMIA: ICD-10-CM

## 2024-07-02 DIAGNOSIS — N13.8 BPH WITH URINARY OBSTRUCTION: ICD-10-CM

## 2024-07-02 PROCEDURE — 3078F DIAST BP <80 MM HG: CPT | Performed by: INTERNAL MEDICINE

## 2024-07-02 PROCEDURE — 1159F MED LIST DOCD IN RCRD: CPT | Performed by: INTERNAL MEDICINE

## 2024-07-02 PROCEDURE — 99214 OFFICE O/P EST MOD 30 MIN: CPT | Performed by: INTERNAL MEDICINE

## 2024-07-02 PROCEDURE — 1126F AMNT PAIN NOTED NONE PRSNT: CPT | Performed by: INTERNAL MEDICINE

## 2024-07-02 PROCEDURE — 3077F SYST BP >= 140 MM HG: CPT | Performed by: INTERNAL MEDICINE

## 2024-07-02 ASSESSMENT — ENCOUNTER SYMPTOMS
EYES NEGATIVE: 1
PSYCHIATRIC NEGATIVE: 1
ENDOCRINE NEGATIVE: 1
ARTHRALGIAS: 1
RESPIRATORY NEGATIVE: 1
HEMATOLOGIC/LYMPHATIC NEGATIVE: 1
CARDIOVASCULAR NEGATIVE: 1
CONSTITUTIONAL NEGATIVE: 1
NEUROLOGICAL NEGATIVE: 1
GASTROINTESTINAL NEGATIVE: 1

## 2024-07-02 ASSESSMENT — PAIN SCALES - GENERAL: PAINLEVEL: 0-NO PAIN

## 2024-07-02 NOTE — PROGRESS NOTES
Patient ID: Arnoldo Gracia is a 83 y.o. male.  Referring Physician: Sg Kee, APRN-CNP  [id number]  [place] [id number]  Primary Care Provider: Zuleyka Kumar MD  Visit Type: Follow Up      Subjective    HPI How was my bloodwork?    Review of Systems   Constitutional: Negative.    HENT:  Negative.     Eyes: Negative.    Respiratory: Negative.     Cardiovascular: Negative.    Gastrointestinal: Negative.    Endocrine: Negative.    Genitourinary: Negative.     Musculoskeletal:  Positive for arthralgias.   Skin: Negative.    Neurological: Negative.    Hematological: Negative.    Psychiatric/Behavioral: Negative.          Objective   BSA: 2.12 meters squared  /62 (BP Location: Right arm)   Pulse 97   Temp 36.3 °C (97.3 °F) (Tympanic)   Resp 16   Wt 90.9 kg (200 lb 6.4 oz)   SpO2 97%   BMI 28.75 kg/m²      has a past medical history of Essential (primary) hypertension, Old myocardial infarction, Personal history of diseases of the blood and blood-forming organs and certain disorders involving the immune mechanism, Personal history of other diseases of the circulatory system, Personal history of other diseases of the circulatory system, Personal history of other endocrine, nutritional and metabolic disease, Personal history of other specified conditions, and Personal history of other specified conditions.   has a past surgical history that includes Other surgical history (04/11/2018); Other surgical history (11/07/2021); Other surgical history (11/07/2021); Other surgical history (11/07/2021); Other surgical history (11/07/2021); Other surgical history (11/07/2021); Other surgical history (11/14/2018); Other surgical history (11/14/2018); Other surgical history (11/14/2018); and Other surgical history (11/14/2018).  Family History   Problem Relation Name Age of Onset    Hypertension Mother      Other (cardiac disorder) Mother      Other (cardiac disorder) Father      Emphysema  Father      Heart attack Father      Colon cancer Sister      Stomach cancer Sister       Oncology History    No history exists.       Arnoldo Gracia  reports that he has never smoked. He has never used smokeless tobacco.  He  reports no history of alcohol use.  He  reports no history of drug use.    Physical Exam  Vitals reviewed.   Constitutional:       Appearance: Normal appearance.   HENT:      Head: Normocephalic.      Mouth/Throat:      Mouth: Mucous membranes are moist.   Eyes:      Extraocular Movements: Extraocular movements intact.      Pupils: Pupils are equal, round, and reactive to light.   Cardiovascular:      Rate and Rhythm: Normal rate and regular rhythm.      Heart sounds: Normal heart sounds.   Pulmonary:      Breath sounds: Normal breath sounds.   Abdominal:      General: Bowel sounds are normal.      Palpations: Abdomen is soft.   Musculoskeletal:         General: Normal range of motion.      Cervical back: Normal range of motion and neck supple.   Skin:     General: Skin is warm.   Neurological:      General: No focal deficit present.      Mental Status: He is alert and oriented to person, place, and time.   Psychiatric:         Mood and Affect: Mood normal.         Behavior: Behavior normal.       WBC   Date/Time Value Ref Range Status   06/28/2024 08:40 AM 5.5 4.4 - 11.3 x10*3/uL Final   02/29/2024 09:02 AM 7.8 4.4 - 11.3 x10*3/uL Final   11/03/2023 08:41 AM 6.2 4.4 - 11.3 x10*3/uL Final     nRBC   Date Value Ref Range Status   06/28/2024   Final     Comment:     Not Measured   02/29/2024   Final     Comment:     Not Measured   09/25/2023 0.3 0.0 - 0.0 /100 WBC Final   07/20/2023 CANCELED       Comment:     Result canceled by the ancillary.   05/25/2023 0.3 0.0 - 0.0 /100 WBC Final     RBC   Date Value Ref Range Status   06/28/2024 3.15 (L) 4.50 - 5.90 x10*6/uL Final   02/29/2024 3.47 (L) 4.50 - 5.90 x10*6/uL Final   11/03/2023 3.34 (L) 4.50 - 5.90 x10*6/uL Final     Hemoglobin   Date Value  "Ref Range Status   06/28/2024 10.1 (L) 13.5 - 17.5 g/dL Final   02/29/2024 11.2 (L) 13.5 - 17.5 g/dL Final   11/03/2023 10.8 (L) 13.5 - 17.5 g/dL Final     Hematocrit   Date Value Ref Range Status   06/28/2024 31.1 (L) 41.0 - 52.0 % Final   02/29/2024 34.3 (L) 41.0 - 52.0 % Final   11/03/2023 33.1 (L) 41.0 - 52.0 % Final     MCV   Date/Time Value Ref Range Status   06/28/2024 08:40 AM 99 80 - 100 fL Final   02/29/2024 09:02 AM 99 80 - 100 fL Final   11/03/2023 08:41 AM 99 80 - 100 fL Final     MCH   Date/Time Value Ref Range Status   06/28/2024 08:40 AM 32.1 26.0 - 34.0 pg Final   02/29/2024 09:02 AM 32.3 26.0 - 34.0 pg Final   11/03/2023 08:41 AM 32.3 26.0 - 34.0 pg Final     MCHC   Date/Time Value Ref Range Status   06/28/2024 08:40 AM 32.5 32.0 - 36.0 g/dL Final   02/29/2024 09:02 AM 32.7 32.0 - 36.0 g/dL Final   11/03/2023 08:41 AM 32.6 32.0 - 36.0 g/dL Final     RDW   Date/Time Value Ref Range Status   06/28/2024 08:40 AM 20.9 (H) 11.5 - 14.5 % Final   02/29/2024 09:02 AM 20.1 (H) 11.5 - 14.5 % Final   11/03/2023 08:41 AM 19.8 (H) 11.5 - 14.5 % Final     Platelets   Date/Time Value Ref Range Status   06/28/2024 08:40  150 - 450 x10*3/uL Final   02/29/2024 09:02  150 - 450 x10*3/uL Final   11/03/2023 08:41  150 - 450 x10*3/uL Final     No results found for: \"MPV\"  Neutrophils %   Date/Time Value Ref Range Status   11/03/2023 08:41 AM 70.2 40.0 - 80.0 % Final   05/25/2023 10:52 AM 74.5 40.0 - 80.0 % Final   03/15/2023 09:43 AM 66.9 40.0 - 80.0 % Final   12/15/2022 08:24 AM 71.1 40.0 - 80.0 % Final     Immature Granulocytes %, Automated   Date/Time Value Ref Range Status   06/28/2024 08:40 AM 5.2 (H) 0.0 - 0.9 % Final     Comment:     Immature Granulocyte Count (IG) includes promyelocytes, myelocytes and metamyelocytes but does not include bands. Percent differential counts (%) should be interpreted in the context of the absolute cell counts (cells/UL).   02/29/2024 09:02 AM 5.9 (H) 0.0 - 0.9 % " Final     Comment:     Immature Granulocyte Count (IG) includes promyelocytes, myelocytes and metamyelocytes but does not include bands. Percent differential counts (%) should be interpreted in the context of the absolute cell counts (cells/UL).   11/03/2023 08:41 AM 5.0 (H) 0.0 - 0.9 % Final     Comment:     Immature Granulocyte Count (IG) includes promyelocytes, myelocytes and metamyelocytes but does not include bands. Percent differential counts (%) should be interpreted in the context of the absolute cell counts (cells/UL).     Lymphocytes %, Manual   Date/Time Value Ref Range Status   06/28/2024 08:40 AM 20.0 13.0 - 44.0 % Final   02/29/2024 09:02 AM 13.0 13.0 - 44.0 % Final     Lymphocytes %   Date/Time Value Ref Range Status   11/03/2023 08:41 AM 18.7 13.0 - 44.0 % Final   09/25/2023 09:10 AM 16.0 13.0 - 44.0 % Final   07/20/2023 09:08 AM 21.0 13.0 - 44.0 % Final   05/25/2023 10:52 AM 13.3 13.0 - 44.0 % Final   04/24/2023 08:28 AM 13.0 13.0 - 44.0 % Final     Monocytes %, Manual   Date/Time Value Ref Range Status   06/28/2024 08:40 AM 2.0 2.0 - 10.0 % Final   02/29/2024 09:02 AM 5.0 2.0 - 10.0 % Final     Monocytes %   Date/Time Value Ref Range Status   11/03/2023 08:41 AM 4.5 2.0 - 10.0 % Final   09/25/2023 09:10 AM 4.0 2.0 - 10.0 % Final   07/20/2023 09:08 AM 2.0 2.0 - 10.0 % Final   05/25/2023 10:52 AM 6.2 2.0 - 10.0 % Final   04/24/2023 08:28 AM 5.0 2.0 - 10.0 % Final     Eosinophils %, Manual   Date/Time Value Ref Range Status   06/28/2024 08:40 AM 0.0 0.0 - 6.0 % Final   02/29/2024 09:02 AM 0.0 0.0 - 6.0 % Final     Eosinophils %   Date/Time Value Ref Range Status   11/03/2023 08:41 AM 1.1 0.0 - 6.0 % Final   09/25/2023 09:10 AM 1.0 0.0 - 6.0 % Final   07/20/2023 09:08 AM 0.0 0.0 - 6.0 % Final   05/25/2023 10:52 AM 0.8 0.0 - 6.0 % Final   04/24/2023 08:28 AM 1.0 0.0 - 6.0 % Final     Basophils %, Manual   Date/Time Value Ref Range Status   06/28/2024 08:40 AM 1.0 0.0 - 2.0 % Final   02/29/2024 09:02 AM  0.0 0.0 - 2.0 % Final     Basophils %   Date/Time Value Ref Range Status   11/03/2023 08:41 AM 0.5 0.0 - 2.0 % Final   09/25/2023 09:10 AM 0.0 0.0 - 2.0 % Final   07/20/2023 09:08 AM 0.0 0.0 - 2.0 % Final   05/25/2023 10:52 AM 0.8 0.0 - 2.0 % Final   04/24/2023 08:28 AM 1.0 0.0 - 2.0 % Final     Neutrophils Absolute   Date/Time Value Ref Range Status   11/03/2023 08:41 AM 4.36 1.60 - 5.50 x10*3/uL Final     Comment:     Percent differential counts (%) should be interpreted in the context of the absolute cell counts (cells/uL).   05/25/2023 10:52 AM 5.53 (H) 1.60 - 5.50 x10E9/L Final   03/15/2023 09:43 AM 4.09 1.60 - 5.50 x10E9/L Final   12/15/2022 08:24 AM 5.85 (H) 1.60 - 5.50 x10E9/L Final     Immature Granulocytes Absolute, Automated   Date/Time Value Ref Range Status   06/28/2024 08:40 AM 0.29 0.00 - 0.50 x10*3/uL Final   02/29/2024 09:02 AM 0.46 0.00 - 0.50 x10*3/uL Final   11/03/2023 08:41 AM 0.31 0.00 - 0.50 x10*3/uL Final     Lymphocytes Absolute   Date/Time Value Ref Range Status   11/03/2023 08:41 AM 1.16 0.80 - 3.00 x10*3/uL Final   05/25/2023 10:52 AM 0.99 0.80 - 3.00 x10E9/L Final   03/15/2023 09:43 AM 1.24 0.80 - 3.00 x10E9/L Final   12/15/2022 08:24 AM 1.47 0.80 - 3.00 x10E9/L Final     Monocytes Absolute   Date/Time Value Ref Range Status   11/03/2023 08:41 AM 0.28 0.05 - 0.80 x10*3/uL Final   05/25/2023 10:52 AM 0.46 0.05 - 0.80 x10E9/L Final   03/15/2023 09:43 AM 0.35 0.05 - 0.80 x10E9/L Final   12/15/2022 08:24 AM 0.60 0.05 - 0.80 x10E9/L Final     Eosinophils Absolute   Date/Time Value Ref Range Status   11/03/2023 08:41 AM 0.07 0.00 - 0.40 x10*3/uL Final   09/25/2023 09:10 AM 0.08 0.00 - 0.40 x10E9/L Final   07/20/2023 09:08 AM 0.00 0.00 - 0.40 x10E9/L Final   05/25/2023 10:52 AM 0.06 0.00 - 0.40 x10E9/L Final   04/24/2023 08:28 AM 0.06 0.00 - 0.40 x10E9/L Final     Eosinophils Absolute, Manual   Date/Time Value Ref Range Status   06/28/2024 08:40 AM 0.00 0.00 - 0.40 x10*3/uL Final   02/29/2024  "09:02 AM 0.00 0.00 - 0.40 x10*3/uL Final     Basophils Absolute   Date/Time Value Ref Range Status   11/03/2023 08:41 AM 0.03 0.00 - 0.10 x10*3/uL Final   09/25/2023 09:10 AM 0.00 0.00 - 0.10 x10E9/L Final   07/20/2023 09:08 AM 0.00 0.00 - 0.10 x10E9/L Final   05/25/2023 10:52 AM 0.06 0.00 - 0.10 x10E9/L Final   04/24/2023 08:28 AM 0.06 0.00 - 0.10 x10E9/L Final     Basophils Absolute, Manual   Date/Time Value Ref Range Status   06/28/2024 08:40 AM 0.06 0.00 - 0.10 x10*3/uL Final   02/29/2024 09:02 AM 0.00 0.00 - 0.10 x10*3/uL Final       No components found for: \"PT\"  aPTT   Date/Time Value Ref Range Status   07/08/2018 06:50 AM 29 25 - 36 sec Final     Comment:       THE APTT IS NO LONGER USED FOR MONITORING     UNFRACTIONATED HEPARIN THERAPY.    FOR MONITORING HEPARIN THERAPY,     USE THE HEPARIN ASSAY.     06/14/2018 10:11 AM 29 25 - 36 sec Final     Comment:       THE APTT IS NO LONGER USED FOR MONITORING     UNFRACTIONATED HEPARIN THERAPY.    FOR MONITORING HEPARIN THERAPY,     USE THE HEPARIN ASSAY.       Medication Documentation Review Audit       Reviewed by Maria Ines Smith MA (Medical Assistant) on 07/02/24 at 0854      Medication Order Taking? Sig Documenting Provider Last Dose Status   allopurinol (Zyloprim) 100 mg tablet [id number] Yes Take 1 tablet (100 mg) by mouth 2 times a day. Historical Provider, MD Taking Active   ALPRAZolam (Xanax) 0.5 mg tablet [id number] Yes Take 1 tablet (0.5 mg) by mouth 3 times a day as needed. Historical Provider, MD Taking Active   amLODIPine (Norvasc) 5 mg tablet [id number] Yes Take 1 tablet (5 mg) by mouth once daily. Tana Victor MD Taking Active   aspirin 81 mg EC tablet [id number] Yes Take 1 tablet (81 mg) by mouth once daily. Historical Provider, MD Taking Active   atorvastatin (Lipitor) 20 mg tablet [id number] Yes Take 1 tablet (20 mg) by mouth once daily. Tana Victor MD Taking Active   cholecalciferol (Vitamin D-3) 50 mcg (2,000 unit) capsule [id number] Yes Take 1 " capsule (50 mcg) by mouth early in the morning.. Historical Provider, MD Taking Active   doxazosin (Cardura) 1 mg tablet [id number] Yes Take 1 tablet (1 mg) by mouth once daily. Tana Victor MD Taking Active   ferrous sulfate (IRON ORAL) [id number] Yes Take 320 mg by mouth once daily. Historical Provider, MD Taking Active   nitroglycerin (Nitrostat) 0.4 mg SL tablet [id number] Yes Place 1 tablet (0.4 mg) under the tongue every 5 minutes if needed for chest pain. CALL 911 IF PAIN PERSISTS. Historical Provider, MD Taking Active   olmesartan (Benicar) 40 mg tablet [id number] Yes Take 1 tablet (40 mg) by mouth once daily. Tana Victor MD Taking Active   pantoprazole (ProtoNix) 40 mg EC tablet [id number] Yes Take 1 tablet (40 mg) by mouth once daily. Historical Provider, MD Taking Active   tamsulosin (Flomax) 0.4 mg 24 hr capsule [id number] Yes Take 1 capsule (0.4 mg) by mouth once daily. Historical Provider, MD Taking Active   vit C,M-Ie-fboym-lutein-zeaxan (PreserVision AREDS-2) 250-90-40-1 mg capsule [id number] Yes Take by mouth once daily. 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 --> hydrea and therapeutic phlebotomy discontinued in 2020  -currently on PO iron supplementation tiwce per week  - Labs done on 6/28/24 show: WBC 5.5, hgb 10.1, plt 220,000, creatinine 1.40, GFR 50, AST 22, ALT 14, TIBC 293  iron saturation 23%, ferritin 91  - Since his iron saturation and ferritin are stable he will continue taking PO iron twice a week  -will see him again 4 months  -hgb is still too high to qualify for JORGE      2) hypertension  - follows with cardiology   -on amlodipine  -on olmesartan     3) hyperlipidemia  -on  atorvastatin     4) BPH  -on flomax  -on cardura     5) renal carcinoma  -6/28/2018 left partial nephrectomy: renal cell carcinoma, 2.7cm mass; pT1aNx; margins uninvolved; ISUP nuclear grade 2     6) hereditary hemochromatosis  -used to undergo regular therapeutic phlebotomy  -however, over time has become too anemic to be phlebotomized any more     Problem List Items Addressed This Visit             ICD-10-CM    Anemia D64.9    Relevant Orders    Clinic Appointment Request Follow Up; GIOVANI BRITO; [facility]    CBC and Auto Differential    Comprehensive metabolic panel    Iron and TIBC    Ferritin            Giovani Brito MD

## 2024-07-03 ENCOUNTER — APPOINTMENT (OUTPATIENT)
Dept: CARDIOLOGY | Facility: CLINIC | Age: 84
End: 2024-07-03
Payer: MEDICARE

## 2024-07-23 NOTE — PROGRESS NOTES
1 year follow-up in patient with atherosclerotic native vessel coronary artery disease and multiple cardiac risk factors.    Subjective :   Interval review of systems is negative for chest discomfort pressure tightness heaviness palpitations lightheadedness orthopnea paroxysmal nocturnal dyspnea dependent edema or claudication TIA or CVA type symptoms or bleeding diathesis        History so Far :  1. Hypertension -   2. Atherosclerotic native vessel coronary artery disease with  percutaneous coronary intervention and stenting of an obtuse marginal  branch in February of 2010 with a 2.75 x 13 mm Cypher drug-eluting  stent. Functional class I  3. Perfusion imaging April 2014, was compromised by shifting  diaphragm attenuation artifact, could not exclude mild inferolateral  ischemia.  4. Perfusion imaging, November 2016, left ventricular ejection  fraction 68%, normal perfusion and TID ratio 0.774. Patient is not interested in pursuing another perfusion study at this time.  5.renal cell carcinoma, requiring partial left  nephrectomy.  6. Upper gastrointestinal bleeding, esophageal ulcer, transient  interruption of anti-platelet therapy, aspirin now resumed.  7. Polycythemia vera, apparently resolved, no longer on hydroxyurea  8. Kidney disease, stage III.  9..s/p partial left nephrectomy for renal cell carcinoma in 2018  10.H/O UGI bleed 2018,EGD showed duodenal ulcer,did not require PRBCs.   11. Stress Myoview December 2022-3.6 METS, 88% age-predicted maximum heart rate, modified Williams protocol, terminated due to fatigue and shortness of breath. Mild fixed perfusion abnormality involving the mid to distal septum with normal wall motion suggesting diaphragm attenuation artifact. LVEF 60% transient ischemic dilatation 1.01 which is normal  12. Echocardiogram December 2022-LVEF 65% borderline left ventricular hypertrophy impaired diastolic filling mild mitral insufficiency no significant change compared to echo of 2010  "left atrial diameter 4.3 cm left atrial volume index 19 mL/mÂ² which seems to be an underestimate   13. Chronic kidney disease stage IIIa GFR 44, July 2023 GFR 50 June 2024  14. Systolic murmur left sternal border, not consistent with aortic stenosis    Objective   Wt Readings from Last 3 Encounters:   07/24/24 89.8 kg (198 lb)   07/02/24 90.9 kg (200 lb 6.4 oz)   03/04/24 91.2 kg (201 lb 1 oz)            Vitals:    07/24/24 0846   BP: 136/60   BP Location: Right arm   Patient Position: Sitting   Pulse: 81   Weight: 89.8 kg (198 lb)   Height: 1.778 m (5' 10\")                Physical Exam:    GENERAL APPEARANCE: in no acute distress.  CHEST: Symmetric and non-tender.  INTEGUMENT: Skin warm and dry  HEENT: No gross abnormalities identified.No pallor or scleral icterus.  NECK: Supple, no JVD, no bruit.   NEURO/PSHCY: Alert and oriented x3; appropriate behavior and responses and responses  LUNGS: Clear to auscultation bilaterally; normal respiratory effort.  HEART: Rate and rhythm regular with no evident murmur; no gallop appreciated.   ABDOMEN: Soft, non tender.  MUSCULOSKELETAL: No gross deformities.  EXTREMITIES: Warm  There is no edema noted.    Meds:  Current Outpatient Medications   Medication Instructions    allopurinol (Zyloprim) 100 mg tablet 1 tablet, oral, 2 times daily    ALPRAZolam (XANAX) 0.5 mg, oral, 3 times daily PRN    amLODIPine (NORVASC) 5 mg, oral, Daily    aspirin 81 mg EC tablet 1 tablet, oral, Daily    atorvastatin (LIPITOR) 20 mg, oral, Daily    cholecalciferol (Vitamin D-3) 50 mcg (2,000 unit) capsule 1 capsule, oral, Daily    colchicine 0.6 mg, oral, Daily    doxazosin (CARDURA) 1 mg, oral, Daily    ferrous sulfate (IRON ORAL) 320 mg, oral, Daily    nitroglycerin (NITROSTAT) 0.4 mg, sublingual, Every 5 min PRN, CALL 911 IF PAIN PERSISTS.    olmesartan (BENICAR) 40 mg, oral, Daily    pantoprazole (ProtoNix) 40 mg EC tablet 1 tablet, oral, Daily    tamsulosin (Flomax) 0.4 mg 24 hr capsule 1 " capsule, oral, Daily    vit C,F-Ue-tnsry-lutein-zeaxan (PreserVision AREDS-2) 250-90-40-1 mg capsule oral, Daily          Allergies   Allergen Reactions    Other Unknown     Bee sting             LABS:    Lab Results   Component Value Date    WBC 5.5 06/28/2024    HGB 10.1 (L) 06/28/2024    HCT 31.1 (L) 06/28/2024     06/28/2024    CHOL 93 06/28/2024    TRIG 164 (H) 06/28/2024    HDL 17.5 06/28/2024    ALT 14 06/28/2024    AST 22 06/28/2024     06/28/2024    K 4.2 06/28/2024     06/28/2024    CREATININE 1.40 (H) 06/28/2024    BUN 18 06/28/2024    CO2 29 06/28/2024    PSA 0.74 09/25/2023    INR 1.5 (H) 07/08/2018                 Patient has low HDL and elevated triglycerides.  No longer on hydroxyurea.      Patient Active Problem List    Diagnosis Date Noted    Low HDL (under 40) 07/24/2024    Hereditary hemochromatosis (CMS-HCC) 11/06/2023    Anemia 09/18/2023    Arteriosclerosis of coronary artery 09/18/2023    BPH with urinary obstruction 09/18/2023    Carcinoma of left kidney (Multi) 09/18/2023    CKD (chronic kidney disease), stage III (Multi) 09/18/2023    Elevated serum creatinine 09/18/2023    Hyperlipidemia 09/18/2023    Hypertension 09/18/2023    Nephrolithiasis 09/18/2023    Polycythemia 09/18/2023    Premature ventricular contraction 09/18/2023    Raynaud's phenomenon without gangrene 09/18/2023    Renal mass, left 09/18/2023    Single kidney 09/18/2023    Retention of urine 09/18/2023    Shortness of breath 09/18/2023    Status post angioplasty 09/18/2023    Systolic ejection murmur 09/18/2023    Former smoker 09/18/2023    Overweight with body mass index (BMI) of 28 to 28.9 in adult 09/18/2023                 Assessment:    1. Primary hypertension  Follow Up In Cardiology    Follow Up In Cardiology    amLODIPine (Norvasc) 5 mg tablet    atorvastatin (Lipitor) 20 mg tablet    doxazosin (Cardura) 1 mg tablet    olmesartan (Benicar) 40 mg tablet      2. Single kidney  Follow Up In  Cardiology    Follow Up In Cardiology    amLODIPine (Norvasc) 5 mg tablet    atorvastatin (Lipitor) 20 mg tablet    doxazosin (Cardura) 1 mg tablet    olmesartan (Benicar) 40 mg tablet      3. Stage 3 chronic kidney disease, unspecified whether stage 3a or 3b CKD (Multi)  Follow Up In Cardiology    Follow Up In Cardiology    amLODIPine (Norvasc) 5 mg tablet    atorvastatin (Lipitor) 20 mg tablet    doxazosin (Cardura) 1 mg tablet    olmesartan (Benicar) 40 mg tablet      4. Mixed hyperlipidemia  Follow Up In Cardiology    Follow Up In Cardiology    amLODIPine (Norvasc) 5 mg tablet    atorvastatin (Lipitor) 20 mg tablet    doxazosin (Cardura) 1 mg tablet    olmesartan (Benicar) 40 mg tablet      5. Low HDL (under 40)  Follow Up In Cardiology      6. Arteriosclerosis of coronary artery  Follow Up In Cardiology    Follow Up In Cardiology    amLODIPine (Norvasc) 5 mg tablet    atorvastatin (Lipitor) 20 mg tablet    doxazosin (Cardura) 1 mg tablet    olmesartan (Benicar) 40 mg tablet      7. Status post angioplasty  Follow Up In Cardiology    Follow Up In Cardiology    amLODIPine (Norvasc) 5 mg tablet    atorvastatin (Lipitor) 20 mg tablet    doxazosin (Cardura) 1 mg tablet    olmesartan (Benicar) 40 mg tablet         Clinical decision making:  Today we addressed patient's hypertension mixed hyperlipidemia atherosclerotic native vessel coronary artery disease, reviewed laboratory data to include lipid profile basic metabolic profile and CBC.  No longer on hydroxyurea for polycythemia.  Clearly does more than 4 METS of activity daily, does not wish to have additional testing for CAD progression at this time.    Follow up : 1 year        Provider Attestation - Scribe documentation    All medical record entries made by the Scribe were at my direction and personally dictated by me. I have reviewed the chart and agree that the record accurately reflects my personal performance of the history, physical exam, discussion and  plan.

## 2024-07-24 ENCOUNTER — APPOINTMENT (OUTPATIENT)
Dept: CARDIOLOGY | Facility: CLINIC | Age: 84
End: 2024-07-24
Payer: MEDICARE

## 2024-07-24 VITALS
HEIGHT: 70 IN | BODY MASS INDEX: 28.35 KG/M2 | WEIGHT: 198 LBS | HEART RATE: 81 BPM | DIASTOLIC BLOOD PRESSURE: 60 MMHG | SYSTOLIC BLOOD PRESSURE: 136 MMHG

## 2024-07-24 DIAGNOSIS — I25.10 ARTERIOSCLEROSIS OF CORONARY ARTERY: ICD-10-CM

## 2024-07-24 DIAGNOSIS — N18.30 STAGE 3 CHRONIC KIDNEY DISEASE, UNSPECIFIED WHETHER STAGE 3A OR 3B CKD (MULTI): ICD-10-CM

## 2024-07-24 DIAGNOSIS — E78.2 MIXED HYPERLIPIDEMIA: ICD-10-CM

## 2024-07-24 DIAGNOSIS — Z98.62 STATUS POST ANGIOPLASTY: ICD-10-CM

## 2024-07-24 DIAGNOSIS — E78.6 LOW HDL (UNDER 40): ICD-10-CM

## 2024-07-24 DIAGNOSIS — Z90.5 SINGLE KIDNEY: ICD-10-CM

## 2024-07-24 DIAGNOSIS — I10 PRIMARY HYPERTENSION: ICD-10-CM

## 2024-07-24 PROBLEM — Z01.810 PREOPERATIVE CARDIOVASCULAR EXAMINATION: Status: RESOLVED | Noted: 2024-01-03 | Resolved: 2024-07-24

## 2024-07-24 PROCEDURE — 1036F TOBACCO NON-USER: CPT | Performed by: INTERNAL MEDICINE

## 2024-07-24 PROCEDURE — 3075F SYST BP GE 130 - 139MM HG: CPT | Performed by: INTERNAL MEDICINE

## 2024-07-24 PROCEDURE — 99214 OFFICE O/P EST MOD 30 MIN: CPT | Performed by: INTERNAL MEDICINE

## 2024-07-24 PROCEDURE — 1159F MED LIST DOCD IN RCRD: CPT | Performed by: INTERNAL MEDICINE

## 2024-07-24 PROCEDURE — 1160F RVW MEDS BY RX/DR IN RCRD: CPT | Performed by: INTERNAL MEDICINE

## 2024-07-24 PROCEDURE — 3078F DIAST BP <80 MM HG: CPT | Performed by: INTERNAL MEDICINE

## 2024-07-24 RX ORDER — OLMESARTAN MEDOXOMIL 40 MG/1
40 TABLET ORAL DAILY
Qty: 90 TABLET | Refills: 3 | Status: SHIPPED | OUTPATIENT
Start: 2024-07-24 | End: 2025-07-24

## 2024-07-24 RX ORDER — AMLODIPINE BESYLATE 5 MG/1
5 TABLET ORAL DAILY
Qty: 90 TABLET | Refills: 3 | Status: SHIPPED | OUTPATIENT
Start: 2024-07-24 | End: 2025-07-24

## 2024-07-24 RX ORDER — COLCHICINE 0.6 MG/1
0.6 TABLET ORAL DAILY
COMMUNITY
Start: 2024-07-11

## 2024-07-24 RX ORDER — DOXAZOSIN 1 MG/1
1 TABLET ORAL DAILY
Qty: 90 TABLET | Refills: 3 | Status: SHIPPED | OUTPATIENT
Start: 2024-07-24

## 2024-07-24 RX ORDER — ATORVASTATIN CALCIUM 20 MG/1
20 TABLET, FILM COATED ORAL DAILY
Qty: 90 TABLET | Refills: 3 | Status: SHIPPED | OUTPATIENT
Start: 2024-07-24 | End: 2025-07-24

## 2024-07-24 NOTE — PATIENT INSTRUCTIONS
Please bring all medicines, vitamins, and herbal supplements with you in original bottles to every appointment! This is the best way to ensure your medication list in your chart is accurate.    Prescriptions will not be filled unless you are compliant with your follow up appointments or have a follow up appointment scheduled as per instruction of your physician. Refills should be requested at the time of your visit.

## 2024-09-04 DIAGNOSIS — N40.1 BPH WITH URINARY OBSTRUCTION: ICD-10-CM

## 2024-09-04 DIAGNOSIS — N13.8 BPH WITH URINARY OBSTRUCTION: ICD-10-CM

## 2024-09-04 RX ORDER — TAMSULOSIN HYDROCHLORIDE 0.4 MG/1
0.4 CAPSULE ORAL DAILY
Qty: 90 CAPSULE | Refills: 3 | Status: SHIPPED | OUTPATIENT
Start: 2024-09-04

## 2024-10-07 ENCOUNTER — HOSPITAL ENCOUNTER (OUTPATIENT)
Dept: RADIOLOGY | Facility: HOSPITAL | Age: 84
Discharge: HOME | End: 2024-10-07
Payer: MEDICARE

## 2024-10-07 DIAGNOSIS — C64.9 MALIGNANT NEOPLASM OF KIDNEY, UNSPECIFIED LATERALITY (MULTI): ICD-10-CM

## 2024-10-07 PROCEDURE — 71046 X-RAY EXAM CHEST 2 VIEWS: CPT

## 2024-10-07 PROCEDURE — 71046 X-RAY EXAM CHEST 2 VIEWS: CPT | Performed by: RADIOLOGY

## 2024-10-07 PROCEDURE — 76770 US EXAM ABDO BACK WALL COMP: CPT | Performed by: STUDENT IN AN ORGANIZED HEALTH CARE EDUCATION/TRAINING PROGRAM

## 2024-10-07 PROCEDURE — 76770 US EXAM ABDO BACK WALL COMP: CPT

## 2024-10-25 ENCOUNTER — LAB (OUTPATIENT)
Dept: LAB | Facility: LAB | Age: 84
End: 2024-10-25
Payer: COMMERCIAL

## 2024-10-25 DIAGNOSIS — C64.9 MALIGNANT NEOPLASM OF KIDNEY, UNSPECIFIED LATERALITY (MULTI): ICD-10-CM

## 2024-10-25 DIAGNOSIS — E79.0 HYPERURICEMIA WITHOUT SIGNS OF INFLAMMATORY ARTHRITIS AND TOPHACEOUS DISEASE: Primary | ICD-10-CM

## 2024-10-25 DIAGNOSIS — N18.31 CHRONIC KIDNEY DISEASE, STAGE 3A (MULTI): ICD-10-CM

## 2024-10-25 DIAGNOSIS — I12.9 HYPERTENSIVE CHRONIC KIDNEY DISEASE WITH STAGE 1 THROUGH STAGE 4 CHRONIC KIDNEY DISEASE, OR UNSPECIFIED CHRONIC KIDNEY DISEASE: ICD-10-CM

## 2024-10-25 DIAGNOSIS — R30.0 DYSURIA: ICD-10-CM

## 2024-10-25 DIAGNOSIS — R82.89 ABNORMAL URINE CYTOLOGY: ICD-10-CM

## 2024-10-25 DIAGNOSIS — Z12.5 SPECIAL SCREENING, PROSTATE CANCER: ICD-10-CM

## 2024-10-25 DIAGNOSIS — N40.0 BENIGN PROSTATIC HYPERPLASIA, UNSPECIFIED WHETHER LOWER URINARY TRACT SYMPTOMS PRESENT: ICD-10-CM

## 2024-10-25 DIAGNOSIS — D50.8 OTHER IRON DEFICIENCY ANEMIA: ICD-10-CM

## 2024-10-25 LAB
ALBUMIN SERPL BCP-MCNC: 4.2 G/DL (ref 3.4–5)
ALP SERPL-CCNC: 61 U/L (ref 33–136)
ALT SERPL W P-5'-P-CCNC: 15 U/L (ref 10–52)
ANION GAP SERPL CALC-SCNC: 10 MMOL/L (ref 10–20)
APPEARANCE UR: CLEAR
AST SERPL W P-5'-P-CCNC: 20 U/L (ref 9–39)
BASOPHILS # BLD MANUAL: 0.06 X10*3/UL (ref 0–0.1)
BASOPHILS NFR BLD MANUAL: 1 %
BILIRUB SERPL-MCNC: 0.6 MG/DL (ref 0–1.2)
BILIRUB UR STRIP.AUTO-MCNC: NEGATIVE MG/DL
BUN SERPL-MCNC: 21 MG/DL (ref 6–23)
CALCIUM SERPL-MCNC: 9.1 MG/DL (ref 8.6–10.3)
CHLORIDE SERPL-SCNC: 103 MMOL/L (ref 98–107)
CO2 SERPL-SCNC: 30 MMOL/L (ref 21–32)
COLOR UR: ABNORMAL
CREAT SERPL-MCNC: 1.48 MG/DL (ref 0.5–1.3)
DACRYOCYTES BLD QL SMEAR: NORMAL
EGFRCR SERPLBLD CKD-EPI 2021: 47 ML/MIN/1.73M*2
EOSINOPHIL # BLD MANUAL: 0.17 X10*3/UL (ref 0–0.4)
EOSINOPHIL NFR BLD MANUAL: 3 %
ERYTHROCYTE [DISTWIDTH] IN BLOOD BY AUTOMATED COUNT: 21.2 % (ref 11.5–14.5)
FERRITIN SERPL-MCNC: 101 NG/ML (ref 20–300)
GLUCOSE SERPL-MCNC: 107 MG/DL (ref 74–99)
GLUCOSE UR STRIP.AUTO-MCNC: NORMAL MG/DL
HCT VFR BLD AUTO: 31.4 % (ref 41–52)
HGB BLD-MCNC: 10 G/DL (ref 13.5–17.5)
IMM GRANULOCYTES # BLD AUTO: 0.39 X10*3/UL (ref 0–0.5)
IMM GRANULOCYTES NFR BLD AUTO: 6.9 % (ref 0–0.9)
IRON SATN MFR SERPL: 24 % (ref 25–45)
IRON SERPL-MCNC: 71 UG/DL (ref 35–150)
KETONES UR STRIP.AUTO-MCNC: NEGATIVE MG/DL
LEUKOCYTE ESTERASE UR QL STRIP.AUTO: NEGATIVE
LYMPHOCYTES # BLD MANUAL: 0.91 X10*3/UL (ref 0.8–3)
LYMPHOCYTES NFR BLD MANUAL: 16 %
MCH RBC QN AUTO: 31.7 PG (ref 26–34)
MCHC RBC AUTO-ENTMCNC: 31.8 G/DL (ref 32–36)
MCV RBC AUTO: 100 FL (ref 80–100)
MONOCYTES # BLD MANUAL: 0.34 X10*3/UL (ref 0.05–0.8)
MONOCYTES NFR BLD MANUAL: 6 %
MYELOCYTES # BLD MANUAL: 0.11 X10*3/UL
MYELOCYTES NFR BLD MANUAL: 2 %
NEUTROPHILS # BLD MANUAL: 4.05 X10*3/UL (ref 1.6–5.5)
NEUTS BAND # BLD MANUAL: 0.29 X10*3/UL (ref 0–0.5)
NEUTS BAND NFR BLD MANUAL: 5 %
NEUTS SEG # BLD MANUAL: 3.76 X10*3/UL (ref 1.6–5)
NEUTS SEG NFR BLD MANUAL: 66 %
NITRITE UR QL STRIP.AUTO: NEGATIVE
NRBC BLD-RTO: 0.7 /100 WBCS (ref 0–0)
OVALOCYTES BLD QL SMEAR: NORMAL
PH UR STRIP.AUTO: 6.5 [PH]
PHOSPHATE SERPL-MCNC: 3.5 MG/DL (ref 2.5–4.9)
PLATELET # BLD AUTO: 230 X10*3/UL (ref 150–450)
POTASSIUM SERPL-SCNC: 4.1 MMOL/L (ref 3.5–5.3)
PROT SERPL-MCNC: 6.6 G/DL (ref 6.4–8.2)
PROT UR STRIP.AUTO-MCNC: ABNORMAL MG/DL
PSA SERPL-MCNC: 0.66 NG/ML
PTH-INTACT SERPL-MCNC: 38.5 PG/ML (ref 18.5–88)
RBC # BLD AUTO: 3.15 X10*6/UL (ref 4.5–5.9)
RBC # UR STRIP.AUTO: NEGATIVE /UL
RBC #/AREA URNS AUTO: NORMAL /HPF
RBC MORPH BLD: NORMAL
SCHISTOCYTES BLD QL SMEAR: NORMAL
SODIUM SERPL-SCNC: 139 MMOL/L (ref 136–145)
SP GR UR STRIP.AUTO: 1.01
SQUAMOUS #/AREA URNS AUTO: NORMAL /HPF
TIBC SERPL-MCNC: 295 UG/DL (ref 240–445)
TOTAL CELLS COUNTED BLD: 100
UIBC SERPL-MCNC: 224 UG/DL (ref 110–370)
URATE SERPL-MCNC: 5.5 MG/DL (ref 4–7.5)
UROBILINOGEN UR STRIP.AUTO-MCNC: ABNORMAL MG/DL
VARIANT LYMPHS # BLD MANUAL: 0.06 X10*3/UL (ref 0–0.3)
VARIANT LYMPHS NFR BLD: 1 %
WBC # BLD AUTO: 5.7 X10*3/UL (ref 4.4–11.3)
WBC #/AREA URNS AUTO: NORMAL /HPF

## 2024-10-25 PROCEDURE — 85027 COMPLETE CBC AUTOMATED: CPT

## 2024-10-25 PROCEDURE — 85007 BL SMEAR W/DIFF WBC COUNT: CPT

## 2024-10-25 PROCEDURE — 84100 ASSAY OF PHOSPHORUS: CPT

## 2024-10-25 PROCEDURE — 36415 COLL VENOUS BLD VENIPUNCTURE: CPT

## 2024-10-25 PROCEDURE — 83540 ASSAY OF IRON: CPT

## 2024-10-25 PROCEDURE — 80053 COMPREHEN METABOLIC PANEL: CPT

## 2024-10-25 PROCEDURE — 87086 URINE CULTURE/COLONY COUNT: CPT

## 2024-10-25 PROCEDURE — 84550 ASSAY OF BLOOD/URIC ACID: CPT

## 2024-10-25 PROCEDURE — 81001 URINALYSIS AUTO W/SCOPE: CPT

## 2024-10-25 PROCEDURE — 83970 ASSAY OF PARATHORMONE: CPT

## 2024-10-25 PROCEDURE — 82728 ASSAY OF FERRITIN: CPT

## 2024-10-25 PROCEDURE — 83550 IRON BINDING TEST: CPT

## 2024-10-25 PROCEDURE — G0103 PSA SCREENING: HCPCS

## 2024-10-26 LAB — BACTERIA UR CULT: NO GROWTH

## 2024-10-28 LAB
LABORATORY COMMENT REPORT: NORMAL
LABORATORY COMMENT REPORT: NORMAL
PATH REPORT.FINAL DX SPEC: NORMAL
PATH REPORT.GROSS SPEC: NORMAL
PATH REPORT.RELEVANT HX SPEC: NORMAL
PATH REPORT.TOTAL CANCER: NORMAL
RESIDENT REVIEW: NORMAL

## 2024-10-28 RX ORDER — NITROFURANTOIN MACROCRYSTALS 50 MG/1
50 CAPSULE ORAL EVERY 12 HOURS
Qty: 4 CAPSULE | Refills: 0 | Status: SHIPPED | OUTPATIENT
Start: 2024-10-28 | End: 2024-10-30

## 2024-10-30 ENCOUNTER — APPOINTMENT (OUTPATIENT)
Dept: UROLOGY | Facility: CLINIC | Age: 84
End: 2024-10-30
Payer: MEDICARE

## 2024-10-30 VITALS
SYSTOLIC BLOOD PRESSURE: 166 MMHG | WEIGHT: 198.19 LBS | DIASTOLIC BLOOD PRESSURE: 69 MMHG | RESPIRATION RATE: 16 BRPM | BODY MASS INDEX: 28.44 KG/M2 | HEART RATE: 106 BPM

## 2024-10-30 DIAGNOSIS — C64.9 MALIGNANT NEOPLASM OF KIDNEY, UNSPECIFIED LATERALITY (MULTI): ICD-10-CM

## 2024-10-30 DIAGNOSIS — R82.89 ABNORMAL URINE CYTOLOGY: ICD-10-CM

## 2024-10-30 DIAGNOSIS — N40.0 BENIGN PROSTATIC HYPERPLASIA, UNSPECIFIED WHETHER LOWER URINARY TRACT SYMPTOMS PRESENT: ICD-10-CM

## 2024-10-30 DIAGNOSIS — R33.9 RETENTION OF URINE: ICD-10-CM

## 2024-10-30 DIAGNOSIS — N40.1 BPH WITH URINARY OBSTRUCTION: Primary | ICD-10-CM

## 2024-10-30 DIAGNOSIS — Z12.5 SPECIAL SCREENING, PROSTATE CANCER: ICD-10-CM

## 2024-10-30 DIAGNOSIS — N13.8 BPH WITH URINARY OBSTRUCTION: Primary | ICD-10-CM

## 2024-10-30 DIAGNOSIS — R30.0 DYSURIA: ICD-10-CM

## 2024-10-30 PROCEDURE — 51798 US URINE CAPACITY MEASURE: CPT | Performed by: UROLOGY

## 2024-10-30 PROCEDURE — 99214 OFFICE O/P EST MOD 30 MIN: CPT | Performed by: UROLOGY

## 2024-10-30 PROCEDURE — 52000 CYSTOURETHROSCOPY: CPT | Performed by: UROLOGY

## 2024-10-30 RX ORDER — TERAZOSIN 1 MG/1
1 CAPSULE ORAL NIGHTLY
Qty: 90 CAPSULE | Refills: 3 | Status: SHIPPED | OUTPATIENT
Start: 2024-10-30 | End: 2025-10-30

## 2024-10-30 RX ORDER — TAMSULOSIN HYDROCHLORIDE 0.4 MG/1
0.4 CAPSULE ORAL DAILY
Qty: 90 CAPSULE | Refills: 3 | Status: SHIPPED | OUTPATIENT
Start: 2024-10-30

## 2024-10-30 RX ORDER — OXYBUTYNIN CHLORIDE 5 MG/1
5 TABLET ORAL DAILY
Qty: 90 TABLET | Refills: 3 | Status: SHIPPED | OUTPATIENT
Start: 2024-10-30 | End: 2025-10-25

## 2024-11-05 ENCOUNTER — OFFICE VISIT (OUTPATIENT)
Dept: HEMATOLOGY/ONCOLOGY | Facility: CLINIC | Age: 84
End: 2024-11-05
Payer: MEDICARE

## 2024-11-05 VITALS
WEIGHT: 190.92 LBS | HEART RATE: 97 BPM | RESPIRATION RATE: 16 BRPM | SYSTOLIC BLOOD PRESSURE: 144 MMHG | TEMPERATURE: 97.7 F | OXYGEN SATURATION: 97 % | DIASTOLIC BLOOD PRESSURE: 65 MMHG | BODY MASS INDEX: 27.39 KG/M2

## 2024-11-05 DIAGNOSIS — D45 POLYCYTHEMIA VERA: ICD-10-CM

## 2024-11-05 DIAGNOSIS — C64.2 CARCINOMA OF LEFT KIDNEY (MULTI): ICD-10-CM

## 2024-11-05 DIAGNOSIS — D50.8 OTHER IRON DEFICIENCY ANEMIA: Primary | ICD-10-CM

## 2024-11-05 DIAGNOSIS — N13.8 BPH WITH URINARY OBSTRUCTION: ICD-10-CM

## 2024-11-05 DIAGNOSIS — I10 PRIMARY HYPERTENSION: ICD-10-CM

## 2024-11-05 DIAGNOSIS — E83.110 HEREDITARY HEMOCHROMATOSIS (CMS-HCC): ICD-10-CM

## 2024-11-05 DIAGNOSIS — E78.2 MIXED HYPERLIPIDEMIA: ICD-10-CM

## 2024-11-05 DIAGNOSIS — N40.1 BPH WITH URINARY OBSTRUCTION: ICD-10-CM

## 2024-11-05 PROCEDURE — 3078F DIAST BP <80 MM HG: CPT | Performed by: INTERNAL MEDICINE

## 2024-11-05 PROCEDURE — 1126F AMNT PAIN NOTED NONE PRSNT: CPT | Performed by: INTERNAL MEDICINE

## 2024-11-05 PROCEDURE — G2211 COMPLEX E/M VISIT ADD ON: HCPCS | Performed by: INTERNAL MEDICINE

## 2024-11-05 PROCEDURE — 1159F MED LIST DOCD IN RCRD: CPT | Performed by: INTERNAL MEDICINE

## 2024-11-05 PROCEDURE — 99214 OFFICE O/P EST MOD 30 MIN: CPT | Performed by: INTERNAL MEDICINE

## 2024-11-05 PROCEDURE — 3077F SYST BP >= 140 MM HG: CPT | Performed by: INTERNAL MEDICINE

## 2024-11-05 ASSESSMENT — ENCOUNTER SYMPTOMS
EYES NEGATIVE: 1
PSYCHIATRIC NEGATIVE: 1
MUSCULOSKELETAL NEGATIVE: 1
CONSTITUTIONAL NEGATIVE: 1
CARDIOVASCULAR NEGATIVE: 1
CONSTIPATION: 1
NEUROLOGICAL NEGATIVE: 1
ENDOCRINE NEGATIVE: 1
RESPIRATORY NEGATIVE: 1
HEMATOLOGIC/LYMPHATIC NEGATIVE: 1

## 2024-11-05 ASSESSMENT — PAIN SCALES - GENERAL: PAINLEVEL_OUTOF10: 0-NO PAIN

## 2024-11-05 NOTE — PROGRESS NOTES
Patient ID: Arnoldo Gracia is a 84 y.o. male.  Referring Physician: Giovani Brito MD  [address]  [place] [id number]  Primary Care Provider: Zuleyka Kumar MD  Visit Type: Follow Up      Subjective    HPI How are my blood counts?  I'm taking iron twice a week    Review of Systems   Constitutional: Negative.    HENT:  Negative.     Eyes: Negative.    Respiratory: Negative.     Cardiovascular: Negative.    Gastrointestinal:  Positive for constipation.   Endocrine: Negative.    Genitourinary: Negative.     Musculoskeletal: Negative.    Skin: Negative.    Neurological: Negative.    Hematological: Negative.    Psychiatric/Behavioral: Negative.          Objective   BSA: 2.07 meters squared  /65 (BP Location: Right arm)   Pulse 97   Temp 36.5 °C (97.7 °F) (Temporal)   Resp 16   Wt 86.6 kg (190 lb 14.7 oz)   SpO2 97%   BMI 27.39 kg/m²      has a past medical history of Essential (primary) hypertension, Old myocardial infarction, Personal history of diseases of the blood and blood-forming organs and certain disorders involving the immune mechanism, Personal history of other diseases of the circulatory system, Personal history of other diseases of the circulatory system, Personal history of other endocrine, nutritional and metabolic disease, Personal history of other specified conditions, and Personal history of other specified conditions.   has a past surgical history that includes Other surgical history (04/11/2018); Other surgical history (11/07/2021); Other surgical history (11/07/2021); Other surgical history (11/07/2021); Other surgical history (11/07/2021); Other surgical history (11/07/2021); Other surgical history (11/14/2018); Other surgical history (11/14/2018); Other surgical history (11/14/2018); and Other surgical history (11/14/2018).  Family History   Problem Relation Name Age of Onset    Hypertension Mother      Other (cardiac disorder) Mother      Other (cardiac disorder)  Father      Emphysema Father      Heart attack Father      Colon cancer Sister      Stomach cancer Sister       Oncology History    No history exists.       Arnoldo Gracia  reports that he has never smoked. He has never used smokeless tobacco.  He  reports no history of alcohol use.  He  reports no history of drug use.    Physical Exam  Vitals reviewed.   Constitutional:       Appearance: Normal appearance.   HENT:      Head: Normocephalic.      Mouth/Throat:      Mouth: Mucous membranes are moist.   Eyes:      Extraocular Movements: Extraocular movements intact.      Pupils: Pupils are equal, round, and reactive to light.   Cardiovascular:      Rate and Rhythm: Normal rate and regular rhythm.      Pulses: Normal pulses.      Heart sounds: Normal heart sounds.   Pulmonary:      Effort: Pulmonary effort is normal.      Breath sounds: Normal breath sounds.   Abdominal:      General: Bowel sounds are normal.      Palpations: Abdomen is soft.   Musculoskeletal:         General: Normal range of motion.      Cervical back: Normal range of motion and neck supple.   Skin:     General: Skin is warm.   Neurological:      General: No focal deficit present.      Mental Status: He is alert and oriented to person, place, and time.   Psychiatric:         Mood and Affect: Mood normal.         Behavior: Behavior normal.       WBC   Date/Time Value Ref Range Status   10/25/2024 08:50 AM 5.7 4.4 - 11.3 x10*3/uL Final   06/28/2024 08:40 AM 5.5 4.4 - 11.3 x10*3/uL Final   02/29/2024 09:02 AM 7.8 4.4 - 11.3 x10*3/uL Final     nRBC   Date Value Ref Range Status   10/25/2024 0.7 (H) 0.0 - 0.0 /100 WBCs Final   06/28/2024   Final     Comment:     Not Measured   02/29/2024   Final     Comment:     Not Measured     RBC   Date Value Ref Range Status   10/25/2024 3.15 (L) 4.50 - 5.90 x10*6/uL Final   06/28/2024 3.15 (L) 4.50 - 5.90 x10*6/uL Final   02/29/2024 3.47 (L) 4.50 - 5.90 x10*6/uL Final     Hemoglobin   Date Value Ref Range Status  "  10/25/2024 10.0 (L) 13.5 - 17.5 g/dL Final   06/28/2024 10.1 (L) 13.5 - 17.5 g/dL Final   02/29/2024 11.2 (L) 13.5 - 17.5 g/dL Final     Hematocrit   Date Value Ref Range Status   10/25/2024 31.4 (L) 41.0 - 52.0 % Final   06/28/2024 31.1 (L) 41.0 - 52.0 % Final   02/29/2024 34.3 (L) 41.0 - 52.0 % Final     MCV   Date/Time Value Ref Range Status   10/25/2024 08:50  80 - 100 fL Final   06/28/2024 08:40 AM 99 80 - 100 fL Final   02/29/2024 09:02 AM 99 80 - 100 fL Final     MCH   Date/Time Value Ref Range Status   10/25/2024 08:50 AM 31.7 26.0 - 34.0 pg Final   06/28/2024 08:40 AM 32.1 26.0 - 34.0 pg Final   02/29/2024 09:02 AM 32.3 26.0 - 34.0 pg Final     MCHC   Date/Time Value Ref Range Status   10/25/2024 08:50 AM 31.8 (L) 32.0 - 36.0 g/dL Final   06/28/2024 08:40 AM 32.5 32.0 - 36.0 g/dL Final   02/29/2024 09:02 AM 32.7 32.0 - 36.0 g/dL Final     RDW   Date/Time Value Ref Range Status   10/25/2024 08:50 AM 21.2 (H) 11.5 - 14.5 % Final   06/28/2024 08:40 AM 20.9 (H) 11.5 - 14.5 % Final   02/29/2024 09:02 AM 20.1 (H) 11.5 - 14.5 % Final     Platelets   Date/Time Value Ref Range Status   10/25/2024 08:50  150 - 450 x10*3/uL Final   06/28/2024 08:40  150 - 450 x10*3/uL Final   02/29/2024 09:02  150 - 450 x10*3/uL Final     No results found for: \"MPV\"  Neutrophils %   Date/Time Value Ref Range Status   11/03/2023 08:41 AM 70.2 40.0 - 80.0 % Final   05/25/2023 10:52 AM 74.5 40.0 - 80.0 % Final   03/15/2023 09:43 AM 66.9 40.0 - 80.0 % Final   12/15/2022 08:24 AM 71.1 40.0 - 80.0 % Final     Immature Granulocytes %, Automated   Date/Time Value Ref Range Status   10/25/2024 08:50 AM 6.9 (H) 0.0 - 0.9 % Final     Comment:     Immature Granulocyte Count (IG) includes promyelocytes, myelocytes and metamyelocytes but does not include bands. Percent differential counts (%) should be interpreted in the context of the absolute cell counts (cells/UL).   06/28/2024 08:40 AM 5.2 (H) 0.0 - 0.9 % Final     " Comment:     Immature Granulocyte Count (IG) includes promyelocytes, myelocytes and metamyelocytes but does not include bands. Percent differential counts (%) should be interpreted in the context of the absolute cell counts (cells/UL).   02/29/2024 09:02 AM 5.9 (H) 0.0 - 0.9 % Final     Comment:     Immature Granulocyte Count (IG) includes promyelocytes, myelocytes and metamyelocytes but does not include bands. Percent differential counts (%) should be interpreted in the context of the absolute cell counts (cells/UL).     Lymphocytes %, Manual   Date/Time Value Ref Range Status   10/25/2024 08:50 AM 16.0 13.0 - 44.0 % Final   06/28/2024 08:40 AM 20.0 13.0 - 44.0 % Final   02/29/2024 09:02 AM 13.0 13.0 - 44.0 % Final     Monocytes %, Manual   Date/Time Value Ref Range Status   10/25/2024 08:50 AM 6.0 2.0 - 10.0 % Final   06/28/2024 08:40 AM 2.0 2.0 - 10.0 % Final   02/29/2024 09:02 AM 5.0 2.0 - 10.0 % Final     Eosinophils %, Manual   Date/Time Value Ref Range Status   10/25/2024 08:50 AM 3.0 0.0 - 6.0 % Final   06/28/2024 08:40 AM 0.0 0.0 - 6.0 % Final   02/29/2024 09:02 AM 0.0 0.0 - 6.0 % Final     Basophils %, Manual   Date/Time Value Ref Range Status   10/25/2024 08:50 AM 1.0 0.0 - 2.0 % Final   06/28/2024 08:40 AM 1.0 0.0 - 2.0 % Final   02/29/2024 09:02 AM 0.0 0.0 - 2.0 % Final     Neutrophils Absolute   Date/Time Value Ref Range Status   11/03/2023 08:41 AM 4.36 1.60 - 5.50 x10*3/uL Final     Comment:     Percent differential counts (%) should be interpreted in the context of the absolute cell counts (cells/uL).   05/25/2023 10:52 AM 5.53 (H) 1.60 - 5.50 x10E9/L Final   03/15/2023 09:43 AM 4.09 1.60 - 5.50 x10E9/L Final   12/15/2022 08:24 AM 5.85 (H) 1.60 - 5.50 x10E9/L Final     Immature Granulocytes Absolute, Automated   Date/Time Value Ref Range Status   10/25/2024 08:50 AM 0.39 0.00 - 0.50 x10*3/uL Final   06/28/2024 08:40 AM 0.29 0.00 - 0.50 x10*3/uL Final   02/29/2024 09:02 AM 0.46 0.00 - 0.50 x10*3/uL  "Final     Lymphocytes Absolute   Date/Time Value Ref Range Status   11/03/2023 08:41 AM 1.16 0.80 - 3.00 x10*3/uL Final   05/25/2023 10:52 AM 0.99 0.80 - 3.00 x10E9/L Final   03/15/2023 09:43 AM 1.24 0.80 - 3.00 x10E9/L Final   12/15/2022 08:24 AM 1.47 0.80 - 3.00 x10E9/L Final     Monocytes Absolute   Date/Time Value Ref Range Status   11/03/2023 08:41 AM 0.28 0.05 - 0.80 x10*3/uL Final   05/25/2023 10:52 AM 0.46 0.05 - 0.80 x10E9/L Final   03/15/2023 09:43 AM 0.35 0.05 - 0.80 x10E9/L Final   12/15/2022 08:24 AM 0.60 0.05 - 0.80 x10E9/L Final     Eosinophils Absolute, Manual   Date/Time Value Ref Range Status   10/25/2024 08:50 AM 0.17 0.00 - 0.40 x10*3/uL Final   06/28/2024 08:40 AM 0.00 0.00 - 0.40 x10*3/uL Final   02/29/2024 09:02 AM 0.00 0.00 - 0.40 x10*3/uL Final     Basophils Absolute, Manual   Date/Time Value Ref Range Status   10/25/2024 08:50 AM 0.06 0.00 - 0.10 x10*3/uL Final   06/28/2024 08:40 AM 0.06 0.00 - 0.10 x10*3/uL Final   02/29/2024 09:02 AM 0.00 0.00 - 0.10 x10*3/uL Final       No components found for: \"PT\"  aPTT   Date/Time Value Ref Range Status   07/08/2018 06:50 AM 29 25 - 36 sec Final     Comment:       THE APTT IS NO LONGER USED FOR MONITORING     UNFRACTIONATED HEPARIN THERAPY.    FOR MONITORING HEPARIN THERAPY,     USE THE HEPARIN ASSAY.     06/14/2018 10:11 AM 29 25 - 36 sec Final     Comment:       THE APTT IS NO LONGER USED FOR MONITORING     UNFRACTIONATED HEPARIN THERAPY.    FOR MONITORING HEPARIN THERAPY,     USE THE HEPARIN ASSAY.       Medication Documentation Review Audit       Reviewed by Maria Ines Smith MA (Medical Assistant) on 11/05/24 at 0900      Medication Order Taking? Sig Documenting Provider Last Dose Status   allopurinol (Zyloprim) 100 mg tablet [id number] Yes Take 1 tablet (100 mg) by mouth 2 times a day. Historical Provider, MD  Active   ALPRAZolam (Xanax) 0.5 mg tablet [id number] Yes Take 1 tablet (0.5 mg) by mouth 3 times a day as needed. Historical Provider, MD  Active "   amLODIPine (Norvasc) 5 mg tablet [id number] Yes Take 1 tablet (5 mg) by mouth once daily. Tana Victor MD  Active   aspirin 81 mg EC tablet [id number] Yes Take 1 tablet (81 mg) by mouth once daily. Historical Provider, MD  Active   atorvastatin (Lipitor) 20 mg tablet [id number] Yes Take 1 tablet (20 mg) by mouth once daily. Tana Victor MD  Active   cholecalciferol (Vitamin D-3) 50 mcg (2,000 unit) capsule [id number] Yes Take 1 capsule (50 mcg) by mouth early in the morning.. Historical Provider, MD  Active   colchicine 0.6 mg tablet [id number] Yes Take 1 tablet (0.6 mg) by mouth once daily. Historical Provider, MD  Active   doxazosin (Cardura) 1 mg tablet [id number] Yes Take 1 tablet (1 mg) by mouth once daily. Tana Victor MD  Active   ferrous sulfate (IRON ORAL) [id number] Yes Take 320 mg by mouth once daily. Historical Provider, MD  Active   nitrofurantoin (Macrodantin) 50 mg capsule [id number]  Take 1 capsule (50 mg) by mouth every 12 hours for 2 days. Starting the night prior to procedure Gil Varner MD   10/30/24 2359   nitroglycerin (Nitrostat) 0.4 mg SL tablet [id number] Yes Place 1 tablet (0.4 mg) under the tongue every 5 minutes if needed for chest pain. CALL 911 IF PAIN PERSISTS. Namrata Begum MD  Active   olmesartan (Benicar) 40 mg tablet [id number] Yes Take 1 tablet (40 mg) by mouth once daily. Tana Victor MD  Active   oxybutynin (Ditropan) 5 mg tablet [id number] Yes Take 1 tablet (5 mg) by mouth early in the morning.. Gil Varner MD  Active   pantoprazole (ProtoNix) 40 mg EC tablet [id number] Yes Take 1 tablet (40 mg) by mouth once daily. Namrata Begum MD  Active     Discontinued 10/30/24 1436   tamsulosin (Flomax) 0.4 mg 24 hr capsule [id number] Yes Take 1 capsule (0.4 mg) by mouth once daily. Gil Varner MD  Active   terazosin (Hytrin) 1 mg capsule [id number] Yes Take 1 capsule (1 mg) by mouth once daily at bedtime. Gil Varner MD  Active   vit  C,A-Da-jbqvv-lutein-zeaxan (PreserVision AREDS-2) 250-90-40-1 mg capsule [id number] Yes Take by mouth once daily. 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 --> hydrea and therapeutic phlebotomy discontinued in 2020  -currently on PO iron supplementation tiwce per week--this has been constipatory so that he has had to take daily stool softener  - Labs done on 10/25/24 show: WBC 5.7, hgb 10.0, plt 230,000, creatinine 1.48, GFR 47, AST 20, ALT 15, TIBC 295  iron saturation 24%, ferritin 101  - Since his iron saturation and ferritin are stable but hemoglobin continues to trend down (although with hgb of 10, he is still too high to qualify for JORGE), we will see if we can possibly take advantage of the fact 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 and see if increasing the PO iron intake, might it raise his hgb somewhat  -advised him to increase PO iron to daily  -will see him again in late December before he goes to [place] for vacation      2) hypertension  - follows with cardiology   -on amlodipine  -on olmesartan     3) hyperlipidemia  -on atorvastatin     4) BPH  -on flomax  -on cardura     5) renal carcinoma  -6/28/2018 left partial nephrectomy: renal cell carcinoma, 2.7cm mass; pT1aNx; margins uninvolved; ISUP nuclear grade 2     6) hereditary hemochromatosis  -used to undergo regular therapeutic phlebotomy  -however, over time has become too anemic to be phlebotomized any more     Problem List Items Addressed This Visit             ICD-10-CM    Anemia D64.9            Giovani Brito MD

## 2024-12-23 ENCOUNTER — LAB (OUTPATIENT)
Dept: LAB | Facility: CLINIC | Age: 84
End: 2024-12-23
Payer: MEDICARE

## 2024-12-23 DIAGNOSIS — D50.8 OTHER IRON DEFICIENCY ANEMIA: ICD-10-CM

## 2024-12-23 LAB
ALBUMIN SERPL BCP-MCNC: 4.3 G/DL (ref 3.4–5)
ALP SERPL-CCNC: 62 U/L (ref 33–136)
ALT SERPL W P-5'-P-CCNC: 12 U/L (ref 10–52)
ANION GAP SERPL CALC-SCNC: 11 MMOL/L (ref 10–20)
AST SERPL W P-5'-P-CCNC: 20 U/L (ref 9–39)
BASOPHILS # BLD MANUAL: 0 X10*3/UL (ref 0–0.1)
BASOPHILS NFR BLD MANUAL: 0 %
BILIRUB SERPL-MCNC: 0.6 MG/DL (ref 0–1.2)
BUN SERPL-MCNC: 25 MG/DL (ref 6–23)
CALCIUM SERPL-MCNC: 9.5 MG/DL (ref 8.6–10.3)
CHLORIDE SERPL-SCNC: 104 MMOL/L (ref 98–107)
CO2 SERPL-SCNC: 29 MMOL/L (ref 21–32)
CREAT SERPL-MCNC: 1.73 MG/DL (ref 0.5–1.3)
DACRYOCYTES BLD QL SMEAR: NORMAL
EGFRCR SERPLBLD CKD-EPI 2021: 39 ML/MIN/1.73M*2
EOSINOPHIL # BLD MANUAL: 0.12 X10*3/UL (ref 0–0.4)
EOSINOPHIL NFR BLD MANUAL: 2 %
ERYTHROCYTE [DISTWIDTH] IN BLOOD BY AUTOMATED COUNT: 21.2 % (ref 11.5–14.5)
FERRITIN SERPL-MCNC: 76 NG/ML (ref 20–300)
GLUCOSE SERPL-MCNC: 104 MG/DL (ref 74–99)
HCT VFR BLD AUTO: 32.5 % (ref 41–52)
HGB BLD-MCNC: 10.3 G/DL (ref 13.5–17.5)
IMM GRANULOCYTES # BLD AUTO: 0.31 X10*3/UL (ref 0–0.5)
IMM GRANULOCYTES NFR BLD AUTO: 5.2 % (ref 0–0.9)
IRON SATN MFR SERPL: 25 % (ref 25–45)
IRON SERPL-MCNC: 76 UG/DL (ref 35–150)
LYMPHOCYTES # BLD MANUAL: 1.71 X10*3/UL (ref 0.8–3)
LYMPHOCYTES NFR BLD MANUAL: 29 %
MCH RBC QN AUTO: 32.1 PG (ref 26–34)
MCHC RBC AUTO-ENTMCNC: 31.7 G/DL (ref 32–36)
MCV RBC AUTO: 101 FL (ref 80–100)
MONOCYTES # BLD MANUAL: 0.18 X10*3/UL (ref 0.05–0.8)
MONOCYTES NFR BLD MANUAL: 3 %
NEUTROPHILS # BLD MANUAL: 3.89 X10*3/UL (ref 1.6–5.5)
NEUTS BAND # BLD MANUAL: 0.35 X10*3/UL (ref 0–0.5)
NEUTS BAND NFR BLD MANUAL: 6 %
NEUTS SEG # BLD MANUAL: 3.54 X10*3/UL (ref 1.6–5)
NEUTS SEG NFR BLD MANUAL: 60 %
NRBC BLD-RTO: ABNORMAL /100{WBCS}
OVALOCYTES BLD QL SMEAR: NORMAL
PLATELET # BLD AUTO: 224 X10*3/UL (ref 150–450)
POLYCHROMASIA BLD QL SMEAR: NORMAL
POTASSIUM SERPL-SCNC: 4.2 MMOL/L (ref 3.5–5.3)
PROT SERPL-MCNC: 6.7 G/DL (ref 6.4–8.2)
RBC # BLD AUTO: 3.21 X10*6/UL (ref 4.5–5.9)
RBC MORPH BLD: NORMAL
SODIUM SERPL-SCNC: 140 MMOL/L (ref 136–145)
TIBC SERPL-MCNC: 300 UG/DL (ref 240–445)
TOTAL CELLS COUNTED BLD: 100
UIBC SERPL-MCNC: 224 UG/DL (ref 110–370)
WBC # BLD AUTO: 5.9 X10*3/UL (ref 4.4–11.3)

## 2024-12-23 PROCEDURE — 80053 COMPREHEN METABOLIC PANEL: CPT

## 2024-12-23 PROCEDURE — 36415 COLL VENOUS BLD VENIPUNCTURE: CPT

## 2024-12-23 PROCEDURE — 85007 BL SMEAR W/DIFF WBC COUNT: CPT

## 2024-12-23 PROCEDURE — 83540 ASSAY OF IRON: CPT

## 2024-12-23 PROCEDURE — 82728 ASSAY OF FERRITIN: CPT

## 2024-12-23 PROCEDURE — 85027 COMPLETE CBC AUTOMATED: CPT

## 2024-12-27 ENCOUNTER — OFFICE VISIT (OUTPATIENT)
Dept: HEMATOLOGY/ONCOLOGY | Facility: CLINIC | Age: 84
End: 2024-12-27
Payer: MEDICARE

## 2024-12-27 VITALS
SYSTOLIC BLOOD PRESSURE: 150 MMHG | WEIGHT: 194.22 LBS | TEMPERATURE: 97.7 F | HEART RATE: 88 BPM | RESPIRATION RATE: 16 BRPM | OXYGEN SATURATION: 97 % | BODY MASS INDEX: 27.87 KG/M2 | DIASTOLIC BLOOD PRESSURE: 73 MMHG

## 2024-12-27 DIAGNOSIS — N18.31 STAGE 3A CHRONIC KIDNEY DISEASE (MULTI): ICD-10-CM

## 2024-12-27 DIAGNOSIS — E83.110 HEREDITARY HEMOCHROMATOSIS (CMS-HCC): ICD-10-CM

## 2024-12-27 DIAGNOSIS — N40.1 BPH WITH URINARY OBSTRUCTION: ICD-10-CM

## 2024-12-27 DIAGNOSIS — I10 PRIMARY HYPERTENSION: ICD-10-CM

## 2024-12-27 DIAGNOSIS — D50.8 OTHER IRON DEFICIENCY ANEMIA: Primary | ICD-10-CM

## 2024-12-27 DIAGNOSIS — E78.2 MIXED HYPERLIPIDEMIA: ICD-10-CM

## 2024-12-27 DIAGNOSIS — C64.2 CARCINOMA OF LEFT KIDNEY (MULTI): ICD-10-CM

## 2024-12-27 DIAGNOSIS — N13.8 BPH WITH URINARY OBSTRUCTION: ICD-10-CM

## 2024-12-27 PROCEDURE — 1126F AMNT PAIN NOTED NONE PRSNT: CPT | Performed by: INTERNAL MEDICINE

## 2024-12-27 PROCEDURE — 99214 OFFICE O/P EST MOD 30 MIN: CPT | Performed by: INTERNAL MEDICINE

## 2024-12-27 PROCEDURE — G2211 COMPLEX E/M VISIT ADD ON: HCPCS | Performed by: INTERNAL MEDICINE

## 2024-12-27 PROCEDURE — 3077F SYST BP >= 140 MM HG: CPT | Performed by: INTERNAL MEDICINE

## 2024-12-27 PROCEDURE — 3078F DIAST BP <80 MM HG: CPT | Performed by: INTERNAL MEDICINE

## 2024-12-27 PROCEDURE — 1159F MED LIST DOCD IN RCRD: CPT | Performed by: INTERNAL MEDICINE

## 2024-12-27 ASSESSMENT — PAIN SCALES - GENERAL: PAINLEVEL_OUTOF10: 0-NO PAIN

## 2024-12-27 NOTE — PROGRESS NOTES
Patient ID: Arnoldo Gracia is a 84 y.o. male.  Referring Physician: Giovani Brito MD  [address]  [place] [address]  Primary Care Provider: Zuleyka Kumar MD  Visit Type: Follow Up      Subjective    HPI How was my bloodwork?    Review of Systems   Constitutional: Negative.    HENT:  Negative.     Eyes: Negative.    Respiratory: Negative.     Cardiovascular: Negative.    Gastrointestinal: Negative.    Endocrine: Negative.    Genitourinary: Negative.     Musculoskeletal: Negative.    Skin: Negative.    Neurological: Negative.    Psychiatric/Behavioral: Negative.          Objective   BSA: 2.09 meters squared  /73 (BP Location: Right arm, Patient Position: Sitting, BP Cuff Size: Large adult)   Pulse 88   Temp 36.5 °C (97.7 °F) (Temporal)   Resp 16   Wt 88.1 kg (194 lb 3.6 oz)   SpO2 97%   BMI 27.87 kg/m²      has a past medical history of Essential (primary) hypertension, Old myocardial infarction, Personal history of diseases of the blood and blood-forming organs and certain disorders involving the immune mechanism, Personal history of other diseases of the circulatory system, Personal history of other diseases of the circulatory system, Personal history of other endocrine, nutritional and metabolic disease, Personal history of other specified conditions, and Personal history of other specified conditions.   has a past surgical history that includes Other surgical history (04/11/2018); Other surgical history (11/07/2021); Other surgical history (11/07/2021); Other surgical history (11/07/2021); Other surgical history (11/07/2021); Other surgical history (11/07/2021); Other surgical history (11/14/2018); Other surgical history (11/14/2018); Other surgical history (11/14/2018); and Other surgical history (11/14/2018).  Family History   Problem Relation Name Age of Onset    Hypertension Mother      Other (cardiac disorder) Mother      Other (cardiac disorder) Father      Emphysema  Father      Heart attack Father      Colon cancer Sister      Stomach cancer Sister       Oncology History    No history exists.       Arnoldo Gracia  reports that he has never smoked. He has never used smokeless tobacco.  He  reports no history of alcohol use.  He  reports no history of drug use.    Physical Exam  Vitals reviewed.   Constitutional:       Appearance: Normal appearance.   HENT:      Head: Normocephalic.      Mouth/Throat:      Mouth: Mucous membranes are moist.   Eyes:      Extraocular Movements: Extraocular movements intact.      Pupils: Pupils are equal, round, and reactive to light.   Cardiovascular:      Rate and Rhythm: Normal rate and regular rhythm.      Pulses: Normal pulses.      Heart sounds: Normal heart sounds.   Pulmonary:      Effort: Pulmonary effort is normal.      Breath sounds: Normal breath sounds.   Abdominal:      General: Bowel sounds are normal.      Palpations: Abdomen is soft.   Musculoskeletal:         General: Normal range of motion.      Cervical back: Normal range of motion and neck supple.   Skin:     General: Skin is warm.   Neurological:      General: No focal deficit present.      Mental Status: He is alert and oriented to person, place, and time.   Psychiatric:         Mood and Affect: Mood normal.         Behavior: Behavior normal.       WBC   Date/Time Value Ref Range Status   12/23/2024 08:12 AM 5.9 4.4 - 11.3 x10*3/uL Final   10/25/2024 08:50 AM 5.7 4.4 - 11.3 x10*3/uL Final   06/28/2024 08:40 AM 5.5 4.4 - 11.3 x10*3/uL Final     nRBC   Date Value Ref Range Status   12/23/2024   Final     Comment:     Not Measured   10/25/2024 0.7 (H) 0.0 - 0.0 /100 WBCs Final   06/28/2024   Final     Comment:     Not Measured     RBC   Date Value Ref Range Status   12/23/2024 3.21 (L) 4.50 - 5.90 x10*6/uL Final   10/25/2024 3.15 (L) 4.50 - 5.90 x10*6/uL Final   06/28/2024 3.15 (L) 4.50 - 5.90 x10*6/uL Final     Hemoglobin   Date Value Ref Range Status   12/23/2024 10.3 (L) 13.5  "- 17.5 g/dL Final   10/25/2024 10.0 (L) 13.5 - 17.5 g/dL Final   06/28/2024 10.1 (L) 13.5 - 17.5 g/dL Final     Hematocrit   Date Value Ref Range Status   12/23/2024 32.5 (L) 41.0 - 52.0 % Final   10/25/2024 31.4 (L) 41.0 - 52.0 % Final   06/28/2024 31.1 (L) 41.0 - 52.0 % Final     MCV   Date/Time Value Ref Range Status   12/23/2024 08:12  (H) 80 - 100 fL Final   10/25/2024 08:50  80 - 100 fL Final   06/28/2024 08:40 AM 99 80 - 100 fL Final     MCH   Date/Time Value Ref Range Status   12/23/2024 08:12 AM 32.1 26.0 - 34.0 pg Final   10/25/2024 08:50 AM 31.7 26.0 - 34.0 pg Final   06/28/2024 08:40 AM 32.1 26.0 - 34.0 pg Final     MCHC   Date/Time Value Ref Range Status   12/23/2024 08:12 AM 31.7 (L) 32.0 - 36.0 g/dL Final   10/25/2024 08:50 AM 31.8 (L) 32.0 - 36.0 g/dL Final   06/28/2024 08:40 AM 32.5 32.0 - 36.0 g/dL Final     RDW   Date/Time Value Ref Range Status   12/23/2024 08:12 AM 21.2 (H) 11.5 - 14.5 % Final   10/25/2024 08:50 AM 21.2 (H) 11.5 - 14.5 % Final   06/28/2024 08:40 AM 20.9 (H) 11.5 - 14.5 % Final     Platelets   Date/Time Value Ref Range Status   12/23/2024 08:12  150 - 450 x10*3/uL Final   10/25/2024 08:50  150 - 450 x10*3/uL Final   06/28/2024 08:40  150 - 450 x10*3/uL Final     No results found for: \"MPV\"  Neutrophils %   Date/Time Value Ref Range Status   11/03/2023 08:41 AM 70.2 40.0 - 80.0 % Final   05/25/2023 10:52 AM 74.5 40.0 - 80.0 % Final   03/15/2023 09:43 AM 66.9 40.0 - 80.0 % Final   12/15/2022 08:24 AM 71.1 40.0 - 80.0 % Final     Immature Granulocytes %, Automated   Date/Time Value Ref Range Status   12/23/2024 08:12 AM 5.2 (H) 0.0 - 0.9 % Final     Comment:     Immature Granulocyte Count (IG) includes promyelocytes, myelocytes and metamyelocytes but does not include bands. Percent differential counts (%) should be interpreted in the context of the absolute cell counts (cells/UL).   10/25/2024 08:50 AM 6.9 (H) 0.0 - 0.9 % Final     Comment:     Immature " Granulocyte Count (IG) includes promyelocytes, myelocytes and metamyelocytes but does not include bands. Percent differential counts (%) should be interpreted in the context of the absolute cell counts (cells/UL).   06/28/2024 08:40 AM 5.2 (H) 0.0 - 0.9 % Final     Comment:     Immature Granulocyte Count (IG) includes promyelocytes, myelocytes and metamyelocytes but does not include bands. Percent differential counts (%) should be interpreted in the context of the absolute cell counts (cells/UL).     Lymphocytes %, Manual   Date/Time Value Ref Range Status   12/23/2024 08:12 AM 29.0 13.0 - 44.0 % Final   10/25/2024 08:50 AM 16.0 13.0 - 44.0 % Final   06/28/2024 08:40 AM 20.0 13.0 - 44.0 % Final     Monocytes %, Manual   Date/Time Value Ref Range Status   12/23/2024 08:12 AM 3.0 2.0 - 10.0 % Final   10/25/2024 08:50 AM 6.0 2.0 - 10.0 % Final   06/28/2024 08:40 AM 2.0 2.0 - 10.0 % Final     Eosinophils %, Manual   Date/Time Value Ref Range Status   12/23/2024 08:12 AM 2.0 0.0 - 6.0 % Final   10/25/2024 08:50 AM 3.0 0.0 - 6.0 % Final   06/28/2024 08:40 AM 0.0 0.0 - 6.0 % Final     Basophils %, Manual   Date/Time Value Ref Range Status   12/23/2024 08:12 AM 0.0 0.0 - 2.0 % Final   10/25/2024 08:50 AM 1.0 0.0 - 2.0 % Final   06/28/2024 08:40 AM 1.0 0.0 - 2.0 % Final     Neutrophils Absolute   Date/Time Value Ref Range Status   11/03/2023 08:41 AM 4.36 1.60 - 5.50 x10*3/uL Final     Comment:     Percent differential counts (%) should be interpreted in the context of the absolute cell counts (cells/uL).   05/25/2023 10:52 AM 5.53 (H) 1.60 - 5.50 x10E9/L Final   03/15/2023 09:43 AM 4.09 1.60 - 5.50 x10E9/L Final   12/15/2022 08:24 AM 5.85 (H) 1.60 - 5.50 x10E9/L Final     Immature Granulocytes Absolute, Automated   Date/Time Value Ref Range Status   12/23/2024 08:12 AM 0.31 0.00 - 0.50 x10*3/uL Final   10/25/2024 08:50 AM 0.39 0.00 - 0.50 x10*3/uL Final   06/28/2024 08:40 AM 0.29 0.00 - 0.50 x10*3/uL Final     Lymphocytes  "Absolute   Date/Time Value Ref Range Status   11/03/2023 08:41 AM 1.16 0.80 - 3.00 x10*3/uL Final   05/25/2023 10:52 AM 0.99 0.80 - 3.00 x10E9/L Final   03/15/2023 09:43 AM 1.24 0.80 - 3.00 x10E9/L Final   12/15/2022 08:24 AM 1.47 0.80 - 3.00 x10E9/L Final     Monocytes Absolute   Date/Time Value Ref Range Status   11/03/2023 08:41 AM 0.28 0.05 - 0.80 x10*3/uL Final   05/25/2023 10:52 AM 0.46 0.05 - 0.80 x10E9/L Final   03/15/2023 09:43 AM 0.35 0.05 - 0.80 x10E9/L Final   12/15/2022 08:24 AM 0.60 0.05 - 0.80 x10E9/L Final     Eosinophils Absolute, Manual   Date/Time Value Ref Range Status   12/23/2024 08:12 AM 0.12 0.00 - 0.40 x10*3/uL Final   10/25/2024 08:50 AM 0.17 0.00 - 0.40 x10*3/uL Final   06/28/2024 08:40 AM 0.00 0.00 - 0.40 x10*3/uL Final     Basophils Absolute, Manual   Date/Time Value Ref Range Status   12/23/2024 08:12 AM 0.00 0.00 - 0.10 x10*3/uL Final   10/25/2024 08:50 AM 0.06 0.00 - 0.10 x10*3/uL Final   06/28/2024 08:40 AM 0.06 0.00 - 0.10 x10*3/uL Final       No components found for: \"PT\"  aPTT   Date/Time Value Ref Range Status   07/08/2018 06:50 AM 29 25 - 36 sec Final     Comment:       THE APTT IS NO LONGER USED FOR MONITORING     UNFRACTIONATED HEPARIN THERAPY.    FOR MONITORING HEPARIN THERAPY,     USE THE HEPARIN ASSAY.     06/14/2018 10:11 AM 29 25 - 36 sec Final     Comment:       THE APTT IS NO LONGER USED FOR MONITORING     UNFRACTIONATED HEPARIN THERAPY.    FOR MONITORING HEPARIN THERAPY,     USE THE HEPARIN ASSAY.       Medication Documentation Review Audit       Reviewed by Marianne Campbell MA (Medical Assistant) on 12/27/24 at 0907      Medication Order Taking? Sig Documenting Provider Last Dose Status   allopurinol (Zyloprim) 100 mg tablet [id number] Yes Take 1 tablet (100 mg) by mouth 2 times a day. Historical Provider, MD  Active   ALPRAZolam (Xanax) 0.5 mg tablet [id number] Yes Take 1 tablet (0.5 mg) by mouth 3 times a day as needed. Historical Provider, MD  Active   amLODIPine " (Norvasc) 5 mg tablet [id number] Yes Take 1 tablet (5 mg) by mouth once daily. Tana Victor MD  Active   aspirin 81 mg EC tablet [id number] Yes Take 1 tablet (81 mg) by mouth once daily. Historical Provider, MD  Active   atorvastatin (Lipitor) 20 mg tablet [id number] Yes Take 1 tablet (20 mg) by mouth once daily. Tana Victor MD  Active   cholecalciferol (Vitamin D-3) 50 mcg (2,000 unit) capsule [id number] Yes Take 1 capsule (50 mcg) by mouth early in the morning.. Historical Provider, MD  Active   colchicine 0.6 mg tablet [id number] Yes Take 1 tablet (0.6 mg) by mouth once daily. Historical Provider, MD  Active   doxazosin (Cardura) 1 mg tablet [id number] Yes Take 1 tablet (1 mg) by mouth once daily. Tana Victor MD  Active   ferrous sulfate (IRON ORAL) [id number] Yes Take 320 mg by mouth once daily. Historical Provider, MD  Active   nitroglycerin (Nitrostat) 0.4 mg SL tablet [id number] Yes Place 1 tablet (0.4 mg) under the tongue every 5 minutes if needed for chest pain. CALL 911 IF PAIN PERSISTS. Historical Provider, MD  Active   olmesartan (Benicar) 40 mg tablet [id number] Yes Take 1 tablet (40 mg) by mouth once daily. Tana Victor MD  Active   oxybutynin (Ditropan) 5 mg tablet [id number] Yes Take 1 tablet (5 mg) by mouth early in the morning.. Gil Varner MD  Active   pantoprazole (ProtoNix) 40 mg EC tablet [id number] Yes Take 1 tablet (40 mg) by mouth once daily. Historical Provider, MD  Active   tamsulosin (Flomax) 0.4 mg 24 hr capsule [id number] Yes Take 1 capsule (0.4 mg) by mouth once daily. Gil Varner MD  Active   terazosin (Hytrin) 1 mg capsule [id number] Yes Take 1 capsule (1 mg) by mouth once daily at bedtime. Gil Varner MD  Active   vit C,A-Sf-ooseh-lutein-zeaxan (PreserVision AREDS-2) 250-90-40-1 mg capsule [id number] Yes Take by mouth once daily. 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 --> hydrea and therapeutic phlebotomy discontinued in 2020  -currently on PO iron supplementation tiwce per week--this has been constipatory so that he has had to take daily stool softener  - Labs done on 10/25/24 show: WBC 5.7, hgb 10.0, plt 230,000, creatinine 1.48, GFR 47, AST 20, ALT 15, TIBC 295  iron saturation 24%, ferritin 101  - Since his iron saturation and ferritin are stable but hemoglobin continues to trend down (although with hgb of 10, he is still too high to qualify for JORGE), we will see if we can possibly take advantage of the fact 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 and see if increasing the PO iron intake, might it raise his hgb somewhat  -advised him to increase PO iron to daily  -here for interval followup  -has been taking PO iron  -labs done on 12/23/2024 included CBC + COMP + iron panel + ferritin  -results reviewed--wbc 5.9, hgb 10.3, plt 224,000, creatinine 1.73, calcium 9.5, AST 20, ALT 12, TIBC 300, sat 25%, ferritin 76  -advised Arnoldo to continue taking PO iron, as there has been some improvement in his hgb  -will see him again in 3 months      2) hypertension  - follows with cardiology   -on amlodipine  -on olmesartan     3) hyperlipidemia  -on atorvastatin     4) BPH  -on flomax  -on cardura     5) renal carcinoma  -6/28/2018 left partial nephrectomy: renal cell carcinoma, 2.7cm mass; pT1aNx; margins uninvolved; ISUP nuclear grade 2     6) hereditary hemochromatosis  -used to undergo regular therapeutic phlebotomy  -however, over time has become too anemic to be phlebotomized any more     Problem List Items Addressed This Visit             ICD-10-CM    Anemia D64.9    Relevant Orders    Clinic Appointment Request Follow Up; CAMI BARRETT; [id number]    CBC and Auto  Differential    Comprehensive metabolic panel    Iron and TIBC    Ferritin            Giovani Brito MD

## 2024-12-29 ASSESSMENT — ENCOUNTER SYMPTOMS
GASTROINTESTINAL NEGATIVE: 1
NEUROLOGICAL NEGATIVE: 1
EYES NEGATIVE: 1
CONSTITUTIONAL NEGATIVE: 1
RESPIRATORY NEGATIVE: 1
ENDOCRINE NEGATIVE: 1
CARDIOVASCULAR NEGATIVE: 1
MUSCULOSKELETAL NEGATIVE: 1
PSYCHIATRIC NEGATIVE: 1

## 2025-03-17 ENCOUNTER — TELEPHONE (OUTPATIENT)
Dept: CARDIOLOGY | Facility: CLINIC | Age: 85
End: 2025-03-17
Payer: MEDICARE

## 2025-03-17 RX ORDER — PANTOPRAZOLE SODIUM 40 MG/1
TABLET, DELAYED RELEASE ORAL
Refills: 0 | OUTPATIENT
Start: 2025-03-17

## 2025-03-17 NOTE — TELEPHONE ENCOUNTER
"Medication refused due to failing protocol.    Requested Prescriptions   Pending Prescriptions Disp Refills    pantoprazole (ProtoNix) 40 mg EC tablet [Pharmacy Med Name: PANTOPRAZOLE SODIUM DR TABS 40MG]  0        Proton Pump Inhibitors Protocol Failed - 3/17/2025 10:21 AM        Failed - Serum magnesium on record in the past year     No results found for: \"MAGNESIUM\"          Passed - Not on Clopidogrel (Plavix) or if on, refill is for Pantoprazole (Protonix)        Passed - No osteoporosis diagnosis on problem list        Passed - Visit with relevant provider in past 12 months or upcoming 90 days     Recent Visits  Date Type Provider Dept   07/24/24 Office Visit Tana Victor MD [id number]   Showing recent visits within past 365 days and meeting all other requirements  Future Appointments  No visits were found meeting these conditions.  Showing future appointments within next 90 days and meeting all other requirements              Passed - Medication not refilled in past 45 days (1.5 months)     No matching medication orders between 1/31/2025 10:21 AM and 3/17/2025 10:21 AM                  "

## 2025-03-17 NOTE — TELEPHONE ENCOUNTER
Pharmacist from [facility] called.   He is concerned that pt is taking Doxazosin 1mg daily along with Terazosin 1mg daily and tamsulosin 0.4mg daily. He wanted Dr. Victor to be aware and will also call Dr. Varner urologist since he ordered the terazosin and tamsulosin.

## 2025-03-17 NOTE — TELEPHONE ENCOUNTER
LEFT DETAILED MESSAGE ON PT'S VOICEMAIL STATING PT TO CONTACT PCP FOR REFILL. THIS IS NOT A CARDIAC MEDICATION. Blu ROMERO

## 2025-03-19 NOTE — TELEPHONE ENCOUNTER
"Spoke with patient and he states a couple weeks ago he was cooking dinner and \"while standing became a little dizzy\" Pt denies any other episodes of dizziness/lightheadedness or concerns. He verbalized understanding to call of any changes or continued episodes.  "

## 2025-04-03 ENCOUNTER — TELEPHONE (OUTPATIENT)
Dept: HEMATOLOGY/ONCOLOGY | Facility: CLINIC | Age: 85
End: 2025-04-03
Payer: MEDICARE

## 2025-04-03 ENCOUNTER — LAB (OUTPATIENT)
Dept: LAB | Facility: HOSPITAL | Age: 85
End: 2025-04-03
Payer: MEDICARE

## 2025-04-03 DIAGNOSIS — D50.8 OTHER IRON DEFICIENCY ANEMIA: ICD-10-CM

## 2025-04-03 LAB
BASOPHILS # BLD MANUAL: 0 X10*3/UL (ref 0–0.1)
BASOPHILS NFR BLD MANUAL: 0 %
DACRYOCYTES BLD QL SMEAR: NORMAL
EOSINOPHIL # BLD MANUAL: 0.06 X10*3/UL (ref 0–0.4)
EOSINOPHIL NFR BLD MANUAL: 1 %
ERYTHROCYTE [DISTWIDTH] IN BLOOD BY AUTOMATED COUNT: 22.3 % (ref 11.5–14.5)
HCT VFR BLD AUTO: 34.2 % (ref 41–52)
HGB BLD-MCNC: 10.5 G/DL (ref 13.5–17.5)
IMM GRANULOCYTES # BLD AUTO: 0.3 X10*3/UL (ref 0–0.5)
IMM GRANULOCYTES NFR BLD AUTO: 5.2 % (ref 0–0.9)
LYMPHOCYTES # BLD MANUAL: 0.81 X10*3/UL (ref 0.8–3)
LYMPHOCYTES NFR BLD MANUAL: 14 %
MCH RBC QN AUTO: 32 PG (ref 26–34)
MCHC RBC AUTO-ENTMCNC: 30.7 G/DL (ref 32–36)
MCV RBC AUTO: 104 FL (ref 80–100)
MONOCYTES # BLD MANUAL: 0.23 X10*3/UL (ref 0.05–0.8)
MONOCYTES NFR BLD MANUAL: 4 %
NEUTROPHILS # BLD MANUAL: 4.69 X10*3/UL (ref 1.6–5.5)
NEUTS BAND # BLD MANUAL: 0.17 X10*3/UL (ref 0–0.5)
NEUTS BAND NFR BLD MANUAL: 3 %
NEUTS SEG # BLD MANUAL: 4.52 X10*3/UL (ref 1.6–5)
NEUTS SEG NFR BLD MANUAL: 78 %
NRBC BLD-RTO: 0 /100 WBCS (ref 0–0)
OVALOCYTES BLD QL SMEAR: NORMAL
PLATELET # BLD AUTO: 241 X10*3/UL (ref 150–450)
POLYCHROMASIA BLD QL SMEAR: NORMAL
RBC # BLD AUTO: 3.28 X10*6/UL (ref 4.5–5.9)
RBC MORPH BLD: NORMAL
SCHISTOCYTES BLD QL SMEAR: NORMAL
TOTAL CELLS COUNTED BLD: 100
WBC # BLD AUTO: 5.8 X10*3/UL (ref 4.4–11.3)

## 2025-04-03 PROCEDURE — 83540 ASSAY OF IRON: CPT

## 2025-04-03 PROCEDURE — 83550 IRON BINDING TEST: CPT

## 2025-04-03 PROCEDURE — 85027 COMPLETE CBC AUTOMATED: CPT

## 2025-04-03 PROCEDURE — 80053 COMPREHEN METABOLIC PANEL: CPT

## 2025-04-03 PROCEDURE — 85007 BL SMEAR W/DIFF WBC COUNT: CPT

## 2025-04-03 PROCEDURE — 82728 ASSAY OF FERRITIN: CPT

## 2025-04-03 NOTE — TELEPHONE ENCOUNTER
Spoke with the patient. States the  at the [facility] lab had Dr. Brito's lab orders. Will wait to see if correct labs collected.

## 2025-04-03 NOTE — TELEPHONE ENCOUNTER
"Spoke with the patient to understand patient went to [facility] lab/[facility]  in [place]. Called this lab, spoke with Zeenat Bates. Leslie states she cannot see what labs were drawn- but \"something\" was drawn about 1 hour ago. Then asked if I could speak to someone at the [facility] lab. States she cannot get a direct contact number that I can get to a staff member at this lab. States the best she can do is send an email or leave a message that will be responded to by EOD.   "

## 2025-04-03 NOTE — TELEPHONE ENCOUNTER
Patient calling to let office know he went this [facility] for lab draw.

## 2025-04-04 ENCOUNTER — OFFICE VISIT (OUTPATIENT)
Dept: HEMATOLOGY/ONCOLOGY | Facility: CLINIC | Age: 85
End: 2025-04-04
Payer: MEDICARE

## 2025-04-04 VITALS
DIASTOLIC BLOOD PRESSURE: 70 MMHG | TEMPERATURE: 98.1 F | BODY MASS INDEX: 26.98 KG/M2 | HEART RATE: 90 BPM | SYSTOLIC BLOOD PRESSURE: 154 MMHG | RESPIRATION RATE: 16 BRPM | OXYGEN SATURATION: 99 % | WEIGHT: 188.05 LBS

## 2025-04-04 DIAGNOSIS — I10 PRIMARY HYPERTENSION: ICD-10-CM

## 2025-04-04 DIAGNOSIS — C64.2 CARCINOMA OF LEFT KIDNEY: ICD-10-CM

## 2025-04-04 DIAGNOSIS — E83.110 HEREDITARY HEMOCHROMATOSIS (CMS-HCC): ICD-10-CM

## 2025-04-04 DIAGNOSIS — E78.2 MIXED HYPERLIPIDEMIA: ICD-10-CM

## 2025-04-04 DIAGNOSIS — D50.8 OTHER IRON DEFICIENCY ANEMIA: Primary | ICD-10-CM

## 2025-04-04 DIAGNOSIS — N40.1 BPH WITH URINARY OBSTRUCTION: ICD-10-CM

## 2025-04-04 DIAGNOSIS — N13.8 BPH WITH URINARY OBSTRUCTION: ICD-10-CM

## 2025-04-04 LAB
ALBUMIN SERPL BCP-MCNC: 4.2 G/DL (ref 3.4–5)
ALP SERPL-CCNC: 69 U/L (ref 33–136)
ALT SERPL W P-5'-P-CCNC: 13 U/L (ref 10–52)
ANION GAP SERPL CALC-SCNC: 12 MMOL/L (ref 10–20)
AST SERPL W P-5'-P-CCNC: 20 U/L (ref 9–39)
BILIRUB SERPL-MCNC: 0.6 MG/DL (ref 0–1.2)
BUN SERPL-MCNC: 22 MG/DL (ref 6–23)
CALCIUM SERPL-MCNC: 9.2 MG/DL (ref 8.6–10.3)
CHLORIDE SERPL-SCNC: 102 MMOL/L (ref 98–107)
CO2 SERPL-SCNC: 28 MMOL/L (ref 21–32)
CREAT SERPL-MCNC: 1.51 MG/DL (ref 0.5–1.3)
EGFRCR SERPLBLD CKD-EPI 2021: 46 ML/MIN/1.73M*2
FERRITIN SERPL-MCNC: 108 NG/ML (ref 20–300)
GLUCOSE SERPL-MCNC: 114 MG/DL (ref 74–99)
IRON SATN MFR SERPL: 24 % (ref 25–45)
IRON SERPL-MCNC: 68 UG/DL (ref 35–150)
POTASSIUM SERPL-SCNC: 4.6 MMOL/L (ref 3.5–5.3)
PROT SERPL-MCNC: 6.9 G/DL (ref 6.4–8.2)
SODIUM SERPL-SCNC: 137 MMOL/L (ref 136–145)
TIBC SERPL-MCNC: 285 UG/DL (ref 240–445)
UIBC SERPL-MCNC: 217 UG/DL (ref 110–370)

## 2025-04-04 PROCEDURE — 1159F MED LIST DOCD IN RCRD: CPT | Performed by: INTERNAL MEDICINE

## 2025-04-04 PROCEDURE — 1126F AMNT PAIN NOTED NONE PRSNT: CPT | Performed by: INTERNAL MEDICINE

## 2025-04-04 PROCEDURE — 3077F SYST BP >= 140 MM HG: CPT | Performed by: INTERNAL MEDICINE

## 2025-04-04 PROCEDURE — 99214 OFFICE O/P EST MOD 30 MIN: CPT | Performed by: INTERNAL MEDICINE

## 2025-04-04 PROCEDURE — 1160F RVW MEDS BY RX/DR IN RCRD: CPT | Performed by: INTERNAL MEDICINE

## 2025-04-04 PROCEDURE — 3078F DIAST BP <80 MM HG: CPT | Performed by: INTERNAL MEDICINE

## 2025-04-04 PROCEDURE — G2211 COMPLEX E/M VISIT ADD ON: HCPCS | Performed by: INTERNAL MEDICINE

## 2025-04-04 ASSESSMENT — PAIN SCALES - GENERAL: PAINLEVEL_OUTOF10: 0-NO PAIN

## 2025-04-04 NOTE — PROGRESS NOTES
Patient ID: Arnoldo Gracia is a 84 y.o. male.  Referring Physician: Giovani Brito MD  [id number]  [place] [address]  Primary Care Provider: Zuleyka Kumar MD  Visit Type: Follow Up      Subjective    HPI How are my blood counts?    Review of Systems   Constitutional: Negative.    HENT:  Negative.     Eyes: Negative.    Respiratory: Negative.     Cardiovascular: Negative.    Gastrointestinal: Negative.    Endocrine: Negative.    Genitourinary: Negative.     Musculoskeletal: Negative.    Skin: Negative.    Neurological: Negative.    Hematological: Negative.    Psychiatric/Behavioral: Negative.          Objective   BSA: 2.05 meters squared  /70 (BP Location: Left arm, Patient Position: Sitting, BP Cuff Size: Adult)   Pulse 90   Temp 36.7 °C (98.1 °F) (Temporal)   Resp 16   Wt 85.3 kg (188 lb 0.8 oz) Comment: No coat, he refused to take his shoes because they are to hard to get back on.  SpO2 99%   BMI 26.98 kg/m²      has a past medical history of Essential (primary) hypertension, Old myocardial infarction, Personal history of diseases of the blood and blood-forming organs and certain disorders involving the immune mechanism, Personal history of other diseases of the circulatory system, Personal history of other diseases of the circulatory system, Personal history of other endocrine, nutritional and metabolic disease, Personal history of other specified conditions, and Personal history of other specified conditions.   has a past surgical history that includes Other surgical history (04/11/2018); Other surgical history (11/07/2021); Other surgical history (11/07/2021); Other surgical history (11/07/2021); Other surgical history (11/07/2021); Other surgical history (11/07/2021); Other surgical history (11/14/2018); Other surgical history (11/14/2018); Other surgical history (11/14/2018); and Other surgical history (11/14/2018).  Family History   Problem Relation Name Age of Onset     Hypertension Mother      Other (cardiac disorder) Mother      Other (cardiac disorder) Father      Emphysema Father      Heart attack Father      Colon cancer Sister      Stomach cancer Sister       Oncology History    No history exists.       Arnoldo Gracia  reports that he has never smoked. He has never used smokeless tobacco.  He  reports no history of alcohol use.  He  reports no history of drug use.    Physical Exam  Vitals reviewed.   Constitutional:       Appearance: Normal appearance.   HENT:      Head: Normocephalic.      Mouth/Throat:      Mouth: Mucous membranes are moist.   Eyes:      Extraocular Movements: Extraocular movements intact.      Pupils: Pupils are equal, round, and reactive to light.   Cardiovascular:      Rate and Rhythm: Normal rate and regular rhythm.      Pulses: Normal pulses.      Heart sounds: Normal heart sounds.   Pulmonary:      Effort: Pulmonary effort is normal.      Breath sounds: Normal breath sounds.   Abdominal:      General: Bowel sounds are normal.      Palpations: Abdomen is soft.   Musculoskeletal:         General: Normal range of motion.      Cervical back: Normal range of motion and neck supple.   Skin:     General: Skin is warm.   Neurological:      General: No focal deficit present.      Mental Status: He is alert and oriented to person, place, and time.   Psychiatric:         Mood and Affect: Mood normal.         Behavior: Behavior normal.         WBC   Date/Time Value Ref Range Status   04/03/2025 08:03 AM 5.8 4.4 - 11.3 x10*3/uL Final   12/23/2024 08:12 AM 5.9 4.4 - 11.3 x10*3/uL Final   10/25/2024 08:50 AM 5.7 4.4 - 11.3 x10*3/uL Final     nRBC   Date Value Ref Range Status   04/03/2025 0.0 0.0 - 0.0 /100 WBCs Final   12/23/2024   Final     Comment:     Not Measured   10/25/2024 0.7 (H) 0.0 - 0.0 /100 WBCs Final     RBC   Date Value Ref Range Status   04/03/2025 3.28 (L) 4.50 - 5.90 x10*6/uL Final   12/23/2024 3.21 (L) 4.50 - 5.90 x10*6/uL Final   10/25/2024 3.15  "(L) 4.50 - 5.90 x10*6/uL Final     Hemoglobin   Date Value Ref Range Status   04/03/2025 10.5 (L) 13.5 - 17.5 g/dL Final   12/23/2024 10.3 (L) 13.5 - 17.5 g/dL Final   10/25/2024 10.0 (L) 13.5 - 17.5 g/dL Final     Hematocrit   Date Value Ref Range Status   04/03/2025 34.2 (L) 41.0 - 52.0 % Final   12/23/2024 32.5 (L) 41.0 - 52.0 % Final   10/25/2024 31.4 (L) 41.0 - 52.0 % Final     MCV   Date/Time Value Ref Range Status   04/03/2025 08:03  (H) 80 - 100 fL Final   12/23/2024 08:12  (H) 80 - 100 fL Final   10/25/2024 08:50  80 - 100 fL Final     MCH   Date/Time Value Ref Range Status   04/03/2025 08:03 AM 32.0 26.0 - 34.0 pg Final   12/23/2024 08:12 AM 32.1 26.0 - 34.0 pg Final   10/25/2024 08:50 AM 31.7 26.0 - 34.0 pg Final     MCHC   Date/Time Value Ref Range Status   04/03/2025 08:03 AM 30.7 (L) 32.0 - 36.0 g/dL Final   12/23/2024 08:12 AM 31.7 (L) 32.0 - 36.0 g/dL Final   10/25/2024 08:50 AM 31.8 (L) 32.0 - 36.0 g/dL Final     RDW   Date/Time Value Ref Range Status   04/03/2025 08:03 AM 22.3 (H) 11.5 - 14.5 % Final   12/23/2024 08:12 AM 21.2 (H) 11.5 - 14.5 % Final   10/25/2024 08:50 AM 21.2 (H) 11.5 - 14.5 % Final     Platelets   Date/Time Value Ref Range Status   04/03/2025 08:03  150 - 450 x10*3/uL Final   12/23/2024 08:12  150 - 450 x10*3/uL Final   10/25/2024 08:50  150 - 450 x10*3/uL Final     No results found for: \"MPV\"  Neutrophils %   Date/Time Value Ref Range Status   11/03/2023 08:41 AM 70.2 40.0 - 80.0 % Final   05/25/2023 10:52 AM 74.5 40.0 - 80.0 % Final   03/15/2023 09:43 AM 66.9 40.0 - 80.0 % Final   12/15/2022 08:24 AM 71.1 40.0 - 80.0 % Final     Immature Granulocytes %, Automated   Date/Time Value Ref Range Status   04/03/2025 08:03 AM 5.2 (H) 0.0 - 0.9 % Final     Comment:     Immature Granulocyte Count (IG) includes promyelocytes, myelocytes and metamyelocytes but does not include bands. Percent differential counts (%) should be interpreted in the context " of the absolute cell counts (cells/UL).   12/23/2024 08:12 AM 5.2 (H) 0.0 - 0.9 % Final     Comment:     Immature Granulocyte Count (IG) includes promyelocytes, myelocytes and metamyelocytes but does not include bands. Percent differential counts (%) should be interpreted in the context of the absolute cell counts (cells/UL).   10/25/2024 08:50 AM 6.9 (H) 0.0 - 0.9 % Final     Comment:     Immature Granulocyte Count (IG) includes promyelocytes, myelocytes and metamyelocytes but does not include bands. Percent differential counts (%) should be interpreted in the context of the absolute cell counts (cells/UL).     Lymphocytes %, Manual   Date/Time Value Ref Range Status   04/03/2025 08:03 AM 14.0 13.0 - 44.0 % Final   12/23/2024 08:12 AM 29.0 13.0 - 44.0 % Final   10/25/2024 08:50 AM 16.0 13.0 - 44.0 % Final     Monocytes %, Manual   Date/Time Value Ref Range Status   04/03/2025 08:03 AM 4.0 2.0 - 10.0 % Final   12/23/2024 08:12 AM 3.0 2.0 - 10.0 % Final   10/25/2024 08:50 AM 6.0 2.0 - 10.0 % Final     Eosinophils %, Manual   Date/Time Value Ref Range Status   04/03/2025 08:03 AM 1.0 0.0 - 6.0 % Final   12/23/2024 08:12 AM 2.0 0.0 - 6.0 % Final   10/25/2024 08:50 AM 3.0 0.0 - 6.0 % Final     Basophils %, Manual   Date/Time Value Ref Range Status   04/03/2025 08:03 AM 0.0 0.0 - 2.0 % Final   12/23/2024 08:12 AM 0.0 0.0 - 2.0 % Final   10/25/2024 08:50 AM 1.0 0.0 - 2.0 % Final     Neutrophils Absolute   Date/Time Value Ref Range Status   11/03/2023 08:41 AM 4.36 1.60 - 5.50 x10*3/uL Final     Comment:     Percent differential counts (%) should be interpreted in the context of the absolute cell counts (cells/uL).   05/25/2023 10:52 AM 5.53 (H) 1.60 - 5.50 x10E9/L Final   03/15/2023 09:43 AM 4.09 1.60 - 5.50 x10E9/L Final   12/15/2022 08:24 AM 5.85 (H) 1.60 - 5.50 x10E9/L Final     Immature Granulocytes Absolute, Automated   Date/Time Value Ref Range Status   04/03/2025 08:03 AM 0.30 0.00 - 0.50 x10*3/uL Final  "  12/23/2024 08:12 AM 0.31 0.00 - 0.50 x10*3/uL Final   10/25/2024 08:50 AM 0.39 0.00 - 0.50 x10*3/uL Final     Lymphocytes Absolute   Date/Time Value Ref Range Status   11/03/2023 08:41 AM 1.16 0.80 - 3.00 x10*3/uL Final   05/25/2023 10:52 AM 0.99 0.80 - 3.00 x10E9/L Final   03/15/2023 09:43 AM 1.24 0.80 - 3.00 x10E9/L Final   12/15/2022 08:24 AM 1.47 0.80 - 3.00 x10E9/L Final     Monocytes Absolute   Date/Time Value Ref Range Status   11/03/2023 08:41 AM 0.28 0.05 - 0.80 x10*3/uL Final   05/25/2023 10:52 AM 0.46 0.05 - 0.80 x10E9/L Final   03/15/2023 09:43 AM 0.35 0.05 - 0.80 x10E9/L Final   12/15/2022 08:24 AM 0.60 0.05 - 0.80 x10E9/L Final     Eosinophils Absolute, Manual   Date/Time Value Ref Range Status   04/03/2025 08:03 AM 0.06 0.00 - 0.40 x10*3/uL Final   12/23/2024 08:12 AM 0.12 0.00 - 0.40 x10*3/uL Final   10/25/2024 08:50 AM 0.17 0.00 - 0.40 x10*3/uL Final     Basophils Absolute, Manual   Date/Time Value Ref Range Status   04/03/2025 08:03 AM 0.00 0.00 - 0.10 x10*3/uL Final   12/23/2024 08:12 AM 0.00 0.00 - 0.10 x10*3/uL Final   10/25/2024 08:50 AM 0.06 0.00 - 0.10 x10*3/uL Final       No components found for: \"PT\"  aPTT   Date/Time Value Ref Range Status   07/08/2018 06:50 AM 29 25 - 36 sec Final     Comment:       THE APTT IS NO LONGER USED FOR MONITORING     UNFRACTIONATED HEPARIN THERAPY.    FOR MONITORING HEPARIN THERAPY,     USE THE HEPARIN ASSAY.     06/14/2018 10:11 AM 29 25 - 36 sec Final     Comment:       THE APTT IS NO LONGER USED FOR MONITORING     UNFRACTIONATED HEPARIN THERAPY.    FOR MONITORING HEPARIN THERAPY,     USE THE HEPARIN ASSAY.       Medication Documentation Review Audit       Reviewed by Marianne Campbell MA (Medical Assistant) on 04/04/25 at 1030      Medication Order Taking? Sig Documenting Provider Last Dose Status   allopurinol (Zyloprim) 100 mg tablet [id number] Yes Take 1 tablet (100 mg) by mouth 2 times a day. Historical Provider, MD  Active   ALPRAZolam (Xanax) 0.5 mg tablet " [id number] Yes Take 1 tablet (0.5 mg) by mouth 3 times a day as needed. Historical Provider, MD  Active   amLODIPine (Norvasc) 5 mg tablet [id number] Yes Take 1 tablet (5 mg) by mouth once daily. Tana Victor MD  Active   aspirin 81 mg EC tablet [id number] Yes Take 1 tablet (81 mg) by mouth once daily. Historical Provider, MD  Active   atorvastatin (Lipitor) 20 mg tablet [id number] Yes Take 1 tablet (20 mg) by mouth once daily. Tana Victor MD  Active   cholecalciferol (Vitamin D-3) 50 mcg (2,000 unit) capsule [id number] Yes Take 1 capsule (50 mcg) by mouth early in the morning.. Historical Provider, MD  Active   colchicine 0.6 mg tablet [id number] Yes Take 1 tablet (0.6 mg) by mouth once daily. Historical Provider, MD  Active   doxazosin (Cardura) 1 mg tablet [id number] Yes Take 1 tablet (1 mg) by mouth once daily. Tana Victor MD  Active   ferrous sulfate (IRON ORAL) [id number] Yes Take 320 mg by mouth once daily. Historical Provider, MD  Active   nitroglycerin (Nitrostat) 0.4 mg SL tablet [id number] Yes Place 1 tablet (0.4 mg) under the tongue every 5 minutes if needed for chest pain. CALL 911 IF PAIN PERSISTS. [place] Provider, MD  Active   olmesartan (Benicar) 40 mg tablet [id number] Yes Take 1 tablet (40 mg) by mouth once daily. Tana Victor MD  Active   oxybutynin (Ditropan) 5 mg tablet [id number] Yes Take 1 tablet (5 mg) by mouth early in the morning.. Gil Varner MD  Active   pantoprazole (ProtoNix) 40 mg EC tablet [id number] Yes Take 1 tablet (40 mg) by mouth once daily. Historical Provider, MD  Active   tamsulosin (Flomax) 0.4 mg 24 hr capsule [id number] Yes Take 1 capsule (0.4 mg) by mouth once daily. Gil Varner MD  Active   terazosin (Hytrin) 1 mg capsule [id number] Yes Take 1 capsule (1 mg) by mouth once daily at bedtime. Gil Varner MD  Active   vit C,N-Jv-hbepl-lutein-zeaxan (PreserVision AREDS-2) 250-90-40-1 mg capsule [id number] Yes Take by mouth once daily. 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 --> hydrea and therapeutic phlebotomy discontinued in 2020  -currently on PO iron supplementation tiwce per week--this has been constipatory so that he has had to take daily stool softener  - Labs done on 10/25/24 show: WBC 5.7, hgb 10.0, plt 230,000, creatinine 1.48, GFR 47, AST 20, ALT 15, TIBC 295  iron saturation 24%, ferritin 101  - Since his iron saturation and ferritin are stable but hemoglobin continues to trend down (although with hgb of 10, he is still too high to qualify for JORGE), we will see if we can possibly take advantage of the fact 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 and see if increasing the PO iron intake, might it raise his hgb somewhat  -advised him to increase PO iron to daily  -here for interval followup  -continue to take  PO iron with stool softener  -labs done on 4/3/2025 included CBC + COMP + iron panel + ferritin  -results reviewed--wbc 5.8, hgb 10.5, plt 241,000, creatinine 1.51, calcium 9.2, AST 20, ALT 13, TIBC 285, sat 24%, ferritin 108  -advised Arnoldo to continue taking PO iron, as it has been helping to maintain his hgb, but has not shown evidence of iron overload  -hgb still too high to qualify for JORGE  -will see him again in 6 months      2) hypertension  - follows with cardiology   -on amlodipine  -on olmesartan     3) hyperlipidemia  -on atorvastatin     4) BPH  -on flomax  -on cardura     5) renal carcinoma  -6/28/2018 left partial nephrectomy: renal cell carcinoma, 2.7cm mass; pT1aNx; margins uninvolved; ISUP nuclear grade 2     6) hereditary hemochromatosis  -used to undergo regular therapeutic phlebotomy  -however, over time has become too anemic to be  phlebotomized any more     Problem List Items Addressed This Visit             ICD-10-CM    Anemia D64.9    Relevant Orders    Clinic Appointment Request Follow Up; GIOVANI BRITO; [facility]    CBC and Auto Differential    Comprehensive metabolic panel    Ferritin    Iron and TIBC            Giovani Brito MD

## 2025-04-07 ASSESSMENT — ENCOUNTER SYMPTOMS
PSYCHIATRIC NEGATIVE: 1
GASTROINTESTINAL NEGATIVE: 1
HEMATOLOGIC/LYMPHATIC NEGATIVE: 1
MUSCULOSKELETAL NEGATIVE: 1
CARDIOVASCULAR NEGATIVE: 1
EYES NEGATIVE: 1
ENDOCRINE NEGATIVE: 1
CONSTITUTIONAL NEGATIVE: 1
RESPIRATORY NEGATIVE: 1
NEUROLOGICAL NEGATIVE: 1

## 2025-07-23 ENCOUNTER — APPOINTMENT (OUTPATIENT)
Dept: CARDIOLOGY | Facility: CLINIC | Age: 85
End: 2025-07-23
Payer: MEDICARE

## 2025-08-04 ENCOUNTER — HOSPITAL ENCOUNTER (OUTPATIENT)
Dept: RADIOLOGY | Facility: HOSPITAL | Age: 85
Discharge: HOME | End: 2025-08-04
Payer: MEDICARE

## 2025-08-04 DIAGNOSIS — C64.9 MALIGNANT NEOPLASM OF KIDNEY, UNSPECIFIED LATERALITY (MULTI): ICD-10-CM

## 2025-08-04 PROCEDURE — 76770 US EXAM ABDO BACK WALL COMP: CPT

## 2025-08-04 PROCEDURE — 71046 X-RAY EXAM CHEST 2 VIEWS: CPT | Performed by: RADIOLOGY

## 2025-08-04 PROCEDURE — 76770 US EXAM ABDO BACK WALL COMP: CPT | Performed by: STUDENT IN AN ORGANIZED HEALTH CARE EDUCATION/TRAINING PROGRAM

## 2025-08-04 PROCEDURE — 71046 X-RAY EXAM CHEST 2 VIEWS: CPT

## 2025-08-08 ENCOUNTER — TELEPHONE (OUTPATIENT)
Dept: HEMATOLOGY/ONCOLOGY | Facility: CLINIC | Age: 85
End: 2025-08-08
Payer: MEDICARE

## 2025-08-08 NOTE — TELEPHONE ENCOUNTER
[facility] is requesting a letter from Dr. Brito regarding what he is being treated for and how he is being treated for his blood disorder and kidney cancer.  Asking if this could be sent to him via [employer].  He has 30 days to provide for his claim.

## 2025-08-11 DIAGNOSIS — R91.1 LUNG NODULE: ICD-10-CM

## 2025-08-13 ENCOUNTER — HOSPITAL ENCOUNTER (OUTPATIENT)
Dept: RADIOLOGY | Facility: HOSPITAL | Age: 85
Discharge: HOME | End: 2025-08-13
Payer: MEDICARE

## 2025-08-13 DIAGNOSIS — R91.1 LUNG NODULE: ICD-10-CM

## 2025-08-13 PROCEDURE — 71260 CT THORAX DX C+: CPT | Performed by: RADIOLOGY

## 2025-08-13 PROCEDURE — 71260 CT THORAX DX C+: CPT

## 2025-08-13 PROCEDURE — 2550000001 HC RX 255 CONTRASTS: Performed by: UROLOGY

## 2025-08-13 RX ADMIN — IOHEXOL 75 ML: 350 INJECTION, SOLUTION INTRAVENOUS at 07:55

## 2025-08-15 DIAGNOSIS — I25.10 ARTERIOSCLEROSIS OF CORONARY ARTERY: ICD-10-CM

## 2025-08-15 DIAGNOSIS — E78.2 MIXED HYPERLIPIDEMIA: ICD-10-CM

## 2025-08-15 DIAGNOSIS — Z98.62 STATUS POST ANGIOPLASTY: ICD-10-CM

## 2025-08-15 DIAGNOSIS — N18.30 STAGE 3 CHRONIC KIDNEY DISEASE, UNSPECIFIED WHETHER STAGE 3A OR 3B CKD (MULTI): ICD-10-CM

## 2025-08-15 DIAGNOSIS — I10 PRIMARY HYPERTENSION: ICD-10-CM

## 2025-08-15 DIAGNOSIS — Z90.5 SINGLE KIDNEY: ICD-10-CM

## 2025-08-15 RX ORDER — OLMESARTAN MEDOXOMIL 40 MG/1
40 TABLET ORAL
Qty: 1 TABLET | Refills: 0 | OUTPATIENT
Start: 2025-08-15

## 2025-08-18 DIAGNOSIS — R16.1 SPLEEN ENLARGEMENT: ICD-10-CM

## 2025-08-18 DIAGNOSIS — R16.1 SPLENOMEGALY: ICD-10-CM

## 2025-08-18 RX ORDER — NITROFURANTOIN MACROCRYSTALS 50 MG/1
50 CAPSULE ORAL EVERY 12 HOURS
Qty: 4 CAPSULE | Refills: 0 | Status: SHIPPED | OUTPATIENT
Start: 2025-08-18 | End: 2025-08-20

## 2025-08-20 ENCOUNTER — APPOINTMENT (OUTPATIENT)
Dept: CARDIOLOGY | Facility: CLINIC | Age: 85
End: 2025-08-20
Payer: MEDICARE

## 2025-08-20 VITALS
SYSTOLIC BLOOD PRESSURE: 144 MMHG | DIASTOLIC BLOOD PRESSURE: 64 MMHG | HEART RATE: 93 BPM | HEIGHT: 70 IN | BODY MASS INDEX: 27.6 KG/M2 | WEIGHT: 192.8 LBS

## 2025-08-20 DIAGNOSIS — I10 PRIMARY HYPERTENSION: ICD-10-CM

## 2025-08-20 DIAGNOSIS — E87.5 HYPERKALEMIA: ICD-10-CM

## 2025-08-20 DIAGNOSIS — Z90.5 SINGLE KIDNEY: ICD-10-CM

## 2025-08-20 DIAGNOSIS — I25.10 CORONARY ARTERY DISEASE INVOLVING NATIVE CORONARY ARTERY OF NATIVE HEART WITHOUT ANGINA PECTORIS: Primary | ICD-10-CM

## 2025-08-20 DIAGNOSIS — N18.30 STAGE 3 CHRONIC KIDNEY DISEASE, UNSPECIFIED WHETHER STAGE 3A OR 3B CKD (MULTI): ICD-10-CM

## 2025-08-20 DIAGNOSIS — Z79.899 MEDICATION COURSE CHANGED: ICD-10-CM

## 2025-08-20 DIAGNOSIS — E78.2 MIXED HYPERLIPIDEMIA: ICD-10-CM

## 2025-08-20 PROCEDURE — 99214 OFFICE O/P EST MOD 30 MIN: CPT | Performed by: INTERNAL MEDICINE

## 2025-08-20 PROCEDURE — 1160F RVW MEDS BY RX/DR IN RCRD: CPT | Performed by: INTERNAL MEDICINE

## 2025-08-20 PROCEDURE — G2211 COMPLEX E/M VISIT ADD ON: HCPCS | Performed by: INTERNAL MEDICINE

## 2025-08-20 PROCEDURE — 1159F MED LIST DOCD IN RCRD: CPT | Performed by: INTERNAL MEDICINE

## 2025-08-20 PROCEDURE — 3078F DIAST BP <80 MM HG: CPT | Performed by: INTERNAL MEDICINE

## 2025-08-20 PROCEDURE — 3077F SYST BP >= 140 MM HG: CPT | Performed by: INTERNAL MEDICINE

## 2025-08-20 RX ORDER — DOXAZOSIN 1 MG/1
1 TABLET ORAL 2 TIMES DAILY
Qty: 180 TABLET | Refills: 3 | Status: SHIPPED | OUTPATIENT
Start: 2025-08-20

## 2025-08-20 RX ORDER — ATORVASTATIN CALCIUM 20 MG/1
20 TABLET, FILM COATED ORAL DAILY
Qty: 90 TABLET | Refills: 3 | Status: SHIPPED | OUTPATIENT
Start: 2025-08-20 | End: 2026-08-20

## 2025-08-20 RX ORDER — AMLODIPINE BESYLATE 5 MG/1
5 TABLET ORAL DAILY
Qty: 90 TABLET | Refills: 3 | Status: SHIPPED | OUTPATIENT
Start: 2025-08-20 | End: 2026-08-20

## 2025-08-20 RX ORDER — OLMESARTAN MEDOXOMIL 40 MG/1
40 TABLET ORAL DAILY
Qty: 90 TABLET | Refills: 3 | Status: SHIPPED | OUTPATIENT
Start: 2025-08-20 | End: 2026-08-20

## 2025-08-21 ENCOUNTER — HOSPITAL ENCOUNTER (OUTPATIENT)
Dept: RADIOLOGY | Facility: HOSPITAL | Age: 85
Discharge: HOME | End: 2025-08-21
Payer: MEDICARE

## 2025-08-21 DIAGNOSIS — R16.1 SPLENOMEGALY: ICD-10-CM

## 2025-08-21 PROCEDURE — 74177 CT ABD & PELVIS W/CONTRAST: CPT

## 2025-08-21 PROCEDURE — 74177 CT ABD & PELVIS W/CONTRAST: CPT | Performed by: RADIOLOGY

## 2025-08-21 PROCEDURE — 2550000001 HC RX 255 CONTRASTS: Performed by: UROLOGY

## 2025-08-21 RX ADMIN — IOHEXOL 75 ML: 350 INJECTION, SOLUTION INTRAVENOUS at 08:29

## 2025-08-24 PROBLEM — Z79.899 MEDICATION COURSE CHANGED: Status: ACTIVE | Noted: 2025-08-24

## 2025-08-25 ENCOUNTER — APPOINTMENT (OUTPATIENT)
Dept: UROLOGY | Facility: CLINIC | Age: 85
End: 2025-08-25
Payer: MEDICARE

## 2025-08-25 VITALS
WEIGHT: 194.22 LBS | SYSTOLIC BLOOD PRESSURE: 155 MMHG | BODY MASS INDEX: 27.87 KG/M2 | DIASTOLIC BLOOD PRESSURE: 72 MMHG | HEART RATE: 99 BPM | RESPIRATION RATE: 16 BRPM

## 2025-08-25 DIAGNOSIS — Z12.5 SCREENING FOR PROSTATE CANCER: ICD-10-CM

## 2025-08-25 DIAGNOSIS — N40.0 BENIGN PROSTATIC HYPERPLASIA, UNSPECIFIED WHETHER LOWER URINARY TRACT SYMPTOMS PRESENT: ICD-10-CM

## 2025-08-25 DIAGNOSIS — N13.8 BPH WITH URINARY OBSTRUCTION: ICD-10-CM

## 2025-08-25 DIAGNOSIS — C64.2 CARCINOMA OF LEFT KIDNEY: Primary | ICD-10-CM

## 2025-08-25 DIAGNOSIS — N40.1 BPH WITH URINARY OBSTRUCTION: ICD-10-CM

## 2025-08-25 DIAGNOSIS — R33.9 RETENTION OF URINE: ICD-10-CM

## 2025-08-25 DIAGNOSIS — R31.9 HEMATURIA, UNSPECIFIED TYPE: ICD-10-CM

## 2025-08-25 PROCEDURE — 99214 OFFICE O/P EST MOD 30 MIN: CPT | Performed by: UROLOGY

## 2025-08-25 PROCEDURE — 51798 US URINE CAPACITY MEASURE: CPT | Performed by: UROLOGY

## 2025-08-25 PROCEDURE — 52000 CYSTOURETHROSCOPY: CPT | Performed by: UROLOGY

## 2025-08-25 RX ORDER — OXYBUTYNIN CHLORIDE 5 MG/1
5 TABLET ORAL DAILY
Qty: 90 TABLET | Refills: 3 | Status: SHIPPED | OUTPATIENT
Start: 2025-08-25 | End: 2026-08-20

## 2025-08-25 RX ORDER — TAMSULOSIN HYDROCHLORIDE 0.4 MG/1
0.4 CAPSULE ORAL DAILY
Qty: 90 CAPSULE | Refills: 3 | Status: SHIPPED | OUTPATIENT
Start: 2025-08-25

## 2026-02-11 ENCOUNTER — APPOINTMENT (OUTPATIENT)
Dept: CARDIOLOGY | Facility: CLINIC | Age: 86
End: 2026-02-11
Payer: MEDICARE

## 2026-08-25 ENCOUNTER — APPOINTMENT (OUTPATIENT)
Dept: UROLOGY | Facility: CLINIC | Age: 86
End: 2026-08-25
Payer: MEDICARE